# Patient Record
Sex: MALE | Race: WHITE | Employment: OTHER | URBAN - METROPOLITAN AREA
[De-identification: names, ages, dates, MRNs, and addresses within clinical notes are randomized per-mention and may not be internally consistent; named-entity substitution may affect disease eponyms.]

---

## 2018-08-08 PROBLEM — R00.1 BRADYCARDIA: Status: ACTIVE | Noted: 2018-05-20

## 2019-06-05 PROBLEM — N17.9 AKI (ACUTE KIDNEY INJURY) (HCC): Status: ACTIVE | Noted: 2019-03-30

## 2019-06-05 PROBLEM — E55.9 VITAMIN D DEFICIENCY: Status: ACTIVE | Noted: 2019-04-01

## 2020-07-21 ENCOUNTER — HOSPITAL ENCOUNTER (OUTPATIENT)
Dept: PHYSICAL THERAPY | Age: 75
Discharge: HOME | End: 2020-07-21
Payer: MEDICARE

## 2020-07-21 PROCEDURE — 97162 PT EVAL MOD COMPLEX 30 MIN: CPT | Performed by: PHYSICAL THERAPIST

## 2020-07-21 PROCEDURE — 97110 THERAPEUTIC EXERCISES: CPT | Performed by: PHYSICAL THERAPIST

## 2020-07-21 NOTE — PROGRESS NOTES
[facility] PHYSICAL THERAPY AT [address], [place], [address]  Phone: [phone]  Fax: [phone] / [address] [facility]  Patient Name: Florencia Henderson : 1945   Medical   Diagnosis: Right knee pain [M25.561] Treatment Diagnosis: R Knee Pain   Onset Date: May 4, 2020     Referral Source: Jya Son, DO Start of Care [facility]): 2020   Prior Hospitalization: See medical history Provider #: [id number]   Prior Level of Function: Unlimited standing/walking tolerance, climbed stairs without limitation   Comorbidities: H/o L knee unicompartment arthroplasty, triple bypass, and bladder surgery for cancer   Medications: Verified on Patient Summary List   The Plan of Care and following information is based on the information from the initial evaluation.   ===========================================================================================  Assessment / key information:  71 y/o male [place] [facility] reports falling from a ladder at home on May 4, 2020, and injured his right knee. He went to ER and was told radiographs were negative. A later MRI found 3 proximal tibia fractures, and he was immobilized, NWB, for 11 weeks. He has an order that states 50% WB R LE until Aug 1st, then begin WBAT. Pt understands the order, but has chosen to bear more weight because it is more convenient for his living situation. He was cautioned that the WB order was \"there for a reason\" and we would be following these restrictions in the office. Examination revealed R knee extension ROM of -15 form full and 105 degrees flexion. There is moderate swelling, but he has no pain. With stretching, he was able to achieve -5 degrees from full, and could do a SLR without a lag by end of session. HE was educated on RICE principle for swelling management.   Mr Jhoan Sofia has swelling, ROM loss, weakness,a dn limited ambulation tolerance s/p traumatic proximal tibia fractures and prolonged immobilization.  ===========================================================================================  Eval Complexity: History MEDIUM  Complexity : 1-2 comorbidities / personal factors will impact the outcome/ POC ;  Examination  MEDIUM Complexity : 3 Standardized tests and measures addressing body structure, function, activity limitation and / or participation in recreation ; Presentation MEDIUM Complexity : Evolving with changing characteristics ; Decision Making MEDIUM Complexity : FOTO score of 26-74; Overall Complexity MEDIUM  Problem List: pain affecting function, decrease ROM, decrease strength, edema affecting function, impaired gait/ balance, decrease ADL/ functional abilitiies, decrease activity tolerance, decrease flexibility/ joint mobility and decrease transfer abilities   Treatment Plan may include any combination of the following: Therapeutic exercise, Therapeutic activities, Neuromuscular re-education, Physical agent/modality, Gait/balance training, Manual therapy, Dry Needling, Aquatic therapy, Patient education, Self Care training, Functional mobility training, Home safety training and Stair training  Patient / Family readiness to learn indicated by: asking questions, trying to perform skills and interest  Persons(s) to be included in education: patient (P)  Barriers to Learning/Limitations: None  Measures taken:    Patient Goal (s): \"gain full mobility and range of motion\"   Patient self reported health status: fair  Rehabilitation Potential: good   Short Term Goals: To be accomplished in  2  weeks:  1. Pt independent with basic HEP for extension biased ROM and strenghtneing,  2. Pt to begin WBAT, and walk with equal gait components bilaterally.  Long Term Goals: To be accomplished in  4  weeks:  1.  Pt to increase FOTO score from 49 to >/= 69.  2. Pt independent with high level HEP for R knee flexibility, strenghtening/endurance, and proprioception  exercises. 3. Pt able to climb up/down stairs with reciprocal steps without pain or residual swelling. 4.     Pt to have R knee PROM of 0- 125 degrees to allow painfree transfers and bike riding. Frequency / Duration:   Patient to be seen  3  times per week for 4  weeks:  Patient / Caregiver education and instruction: self care, activity modification and exercises    Therapist Signature: Ortiz Garzon PT Date: 3/04/2344   Certification Period: 7/21/20 -10/21/20 Time: 1:36 PM   ===========================================================================================  I certify that the above Physical Therapy Services are being furnished while the patient is under my care. I agree with the treatment plan and certify that this therapy is necessary. Physician Signature:        Date:       Time:     Please sign and return to [facility] [place] or you may fax the signed copy to [phone]. Thank you.

## 2020-07-21 NOTE — PROGRESS NOTES
PHYSICAL THERAPY - DAILY TREATMENT NOTE    Patient Name: Bayron Bermudez        Date: 2020  : 1945   YES Patient  Verified  Visit #:     Insurance: Payor: Priscilla Slight / Plan: [place] & B / Product Type: Medicare /      In time: 3:05 Out time: 4:00   Total Treatment Time: 55     BCBS/Medicare Time Tracking (below)   Total Timed Codes (min):  15 1:1 Treatment Time:  15     TREATMENT AREA =  Right knee pain [M25.561]    SUBJECTIVE  Pain Level (on 0 to 10 scale):  0  / 10   Medication Changes/New allergies or changes in medical history, any new surgeries or procedures?     NO    If yes, update Summary List   Subjective Functional Status/Changes:  []  No changes reported     See eval/POC           Modalities Rationale:     decrease edema, decrease inflammation and decrease pain to improve patient's ability to prevent soreness/swelling   min [] Estim, type/location:                                      []  att     []  unatt     []  w/US     []  w/ice    []  w/heat    min []  Mechanical Traction: type/lbs                   []  pro   []  sup   []  int   []  cont    []  before manual    []  after manual    min []  Ultrasound, settings/location:      min []  Iontophoresis w/ dexamethasone, location:                                               []  take home patch       []  in clinic   10 min [x]  Ice     []  Heat    location/position: R knee - supine after treatment    min []  Vasopneumatic Device, press/temp:     min []  Other:    [x] Skin assessment post-treatment (if applicable):    [x]  intact    []  redness- no adverse reaction     []redness  adverse reaction:        15 min Therapeutic Exercise:  [x]  See flow sheet   Rationale:      increase ROM, increase strength and improve coordination to improve the patients ability to regain functional strength for normal gait         Billed With/As:   [] TE   [] TA   [] Neuro   [] Self Care Patient Education: [x] Review HEP    [] Progressed/Changed HEP based on:   [] positioning   [] body mechanics   [] transfers   [] heat/ice application    [] other:      Other Objective/Functional Measures:    FOTO - 49     Post Treatment Pain Level (on 0 to 10) scale:   0  / 10     ASSESSMENT  Assessment/Changes in Function:     Pt has ROM loss, swelling, weakness, and limited gait s/p proximal tibia fractures form a fall form ladder. []  See Progress Note/Recertification   Patient will continue to benefit from skilled PT services to modify and progress therapeutic interventions, address functional mobility deficits, address ROM deficits, address strength deficits, analyze and address soft tissue restrictions, analyze and cue movement patterns, analyze and modify body mechanics/ergonomics, and assess and modify postural abnormalities to attain remaining goals.    Progress toward goals / Updated goals:    Goals established today     PLAN  [x]  Upgrade activities as tolerated YES Continue plan of care   []  Discharge due to :    []  Other:      Therapist: Marian Schulte PT    Date: 7/21/2020 Time: 1:36 PM     Future Appointments   Date Time Provider Christian Mcgill   7/21/2020  3:00 PM [facility] [place] [facility]   8/10/2020  1:00 PM [facility] URODYNAMICS [facility] OpenPM   8/14/2020 11:30 AM [facility] ULTRASOUND [facility] OpenPM   8/19/2020 11:30 AM Linda Moran MD [facility] OpenPM

## 2020-07-23 ENCOUNTER — HOSPITAL ENCOUNTER (OUTPATIENT)
Dept: PHYSICAL THERAPY | Age: 75
Discharge: HOME | End: 2020-07-23
Payer: MEDICARE

## 2020-07-23 PROCEDURE — 97140 MANUAL THERAPY 1/> REGIONS: CPT

## 2020-07-23 PROCEDURE — 97110 THERAPEUTIC EXERCISES: CPT

## 2020-07-23 NOTE — PROGRESS NOTES
PHYSICAL THERAPY - DAILY TREATMENT NOTE     Patient Name: John Styles        Date: 2020  : 1945   YES Patient  Verified  Visit #:      12  Insurance: Payor: Álvaro Valera / Plan: [place] MEDICARE PART A & B / Product Type: Medicare /      In time: 5574 Out time: 110   Total Treatment Time: 45     Medicare/BCBS Time Tracking (below)   Total Timed Codes (min):  35 1:1 Treatment Time:  35     TREATMENT AREA =  Right knee pain [M25.561]    SUBJECTIVE    Pain Level (on 0 to 10 scale):  0  / 10   Medication Changes/New allergies or changes in medical history, any new surgeries or procedures? NO    If yes, update Summary List   Subjective Functional Status/Changes:  []  No changes reported       Functional improvements: Pt reports compliance with HEP. Functional impairments: Limited weightbearing capacity, knee ROM. OBJECTIVE  Modalities Rationale:     decrease inflammation and decrease pain to improve patient's ability to ambulate. min [] Estim, type/location:                                      []  att     []  unatt     []  w/US     []  w/ice    []  w/heat    min []  Mechanical Traction: type/lbs                   []  pro   []  sup   []  int   []  cont    []  before manual    []  after manual    min []  Ultrasound, settings/location:      min []  Iontophoresis w/ dexamethasone, location:                                               []  take home patch       []  in clinic   10 min [x]  Ice     []  Heat    location/position: Supine, (R) knee. min []  Vasopneumatic Device, press/temp:     min []  Other:    [x] Skin assessment post-treatment (if applicable):    [x]  intact    []  redness- no adverse reaction     []redness  adverse reaction:      20 min Therapeutic Exercise:  [x]  See flow sheet   Rationale:      increase ROM, increase strength, improve coordination and improve balance to improve the patients ability to perform pain free ADLs.       15 min Manual Therapy: Technique:      [x] S/DTM []IASTM [x]PROM [] Passive Stretching   [x]manual TPR    []Jt manipulation:Gr I [] II []  III [] IV[] V[]  Treatment Area:     Rationale:      decrease pain, increase ROM, increase tissue extensibility and decrease trigger points to improve patient's ability to perform pain free ADLs. Billed With/As:   [x] TE   [] TA   [] Neuro   [] Self Care Patient Education: [x] Review HEP    [] Progressed/Changed HEP based on:   [] positioning   [] body mechanics   [] transfers   [] heat/ice application    [] other:        Other Objective/Functional Measures:    Heel slides - knee flexion ROM = 140 deg   Knee ext w/ manual OP = -3 deg      Post Treatment Pain Level (on 0 to 10) scale:   0  / 10     ASSESSMENT    Assessment/Changes in Function:     Significant improvement in knee ROM. Pt non-compliant with 50% weightbearing orders, did not attempt standing activities due to this. []  See Progress Note/Recertification   Patient will continue to benefit from skilled PT services to modify and progress therapeutic interventions, address functional mobility deficits, address ROM deficits, address strength deficits, analyze and address soft tissue restrictions, analyze and cue movement patterns and analyze and modify body mechanics/ergonomics to attain remaining goals. Progress toward goals / Updated goals:    Good progress toward LTG #4.       PLAN    [x]  Upgrade activities as tolerated YES Continue plan of care   []  Discharge due to :    []  Other:      Therapist: Arias Almanzar PTA    Date: 7/23/2020 Time: 2:51 PM     Future Appointments   Date Time Provider Christian Mcgill   7/24/2020  4:00 PM Maybelle Rule [facility]   7/28/2020 10:15 AM [facility]   7/29/2020  9:15 AM Nicole Barakat PT [facility]   7/31/2020  4:00 PM Maybelle Rule [facility]   8/3/2020  8:45 AM Venkat Almeida PT [facility]   8/5/2020 11:30 AM Danita [facility] 8/7/2020 12:30 PM Thelma Grinder [facility]   8/10/2020 10:45 AM Thelma Grinder [facility]   8/10/2020  1:00 PM [facility]   8/12/2020 10:45 AM Thelma Grinder [facility]   8/14/2020 11:30 AM [facility] ULTRASOUND [facility] OpenPM   8/14/2020  2:30 PM Ino Burch PT [facility]   8/19/2020 11:30 AM Ashley Matos MD [facility] OpenPM

## 2020-07-24 ENCOUNTER — HOSPITAL ENCOUNTER (OUTPATIENT)
Dept: PHYSICAL THERAPY | Age: 75
Discharge: HOME | End: 2020-07-24
Payer: MEDICARE

## 2020-07-24 PROCEDURE — 97110 THERAPEUTIC EXERCISES: CPT

## 2020-07-24 PROCEDURE — 97140 MANUAL THERAPY 1/> REGIONS: CPT

## 2020-07-24 NOTE — PROGRESS NOTES
PHYSICAL THERAPY - DAILY TREATMENT NOTE    Patient Name: Te Fuentes        Date: 2020  : 1945    Patient  Verified: YES  Visit #:   3   of   12  Insurance: Payor: Radhika Feeler / Plan: [place] MEDICARE PART A & B / Product Type: Medicare /      In time: 4:05 Out time: 4:40   Total Treatment Time: 35     Medicare/BCBS Time Tracking (below)   Total Timed Codes (min):  25 1:1 Treatment Time:  25     TREATMENT AREA/ DIAGNOSIS = Right knee pain [M25.561]    SUBJECTIVE  Pain Level (on 0 to 10 scale):  0  / 10   Medication Changes/New allergies or changes in medical history, any new surgeries or procedures?     NO    If yes, update Summary List   Subjective Functional Status/Changes:  []  No changes reported     Functional improvement no pain   Functional limitation  I cant golf        OBJECTIVE  Modalities Rationale:     decrease edema, decrease inflammation and decrease pain to improve patient's ability to perform ADLs without pain   min [] Estim, type/location:                                      []  att     []  unatt     []  w/US     []  w/ice    []  w/heat    min []  Mechanical Traction: type/lbs                   []  pro   []  sup   []  int   []  cont    []  before manual    []  after manual    min []  Ultrasound, settings/location:      min []  Iontophoresis w/ dexamethasone, location:                                               []  take home patch       []  in clinic   10 min [x]  Ice     []  Heat    location/position:     min []  Vasopneumatic Device, press/temp:     min []  Other:    [x] Skin assessment post-treatment (if applicable):    [x]  intact    []  redness- no adverse reaction     []redness  adverse reaction:        8 min Manual Therapy: DTM to post knee/quad, patellar mobs and passive extension   Rationale:      decrease pain, increase ROM and increase tissue extensibility to improve patient's ability to perform ADLs without pain    17 min Therapeutic Exercise:  [x]  See flow sheet Rationale:      increase ROM and increase strength to improve the patients ability to perform ADLs without pain     Billed With/As:   [x] TE   [] TA   [] Neuro   [] Self Care Patient Education: [x] Review HEP    [] Progressed/Changed HEP based on:   [] positioning   [] body mechanics   [] transfers   [] heat/ice application    [] other:        Other Objective/Functional Measures:  Walking with st cane with lacking TKE  Pt with -10 degrees of active extension  Able to get full passive ext after manual  SLR without lag  Added bridge and clam   Post Treatment Pain Level (on 0 to 10) scale:   0  / 10     ASSESSMENT  Assessment/Changes in Function:     No pain with bridge or clam     []  See Progress Note/Recertification   Patient will continue to benefit from skilled PT services to modify and progress therapeutic interventions, address functional mobility deficits, address ROM deficits, address strength deficits, analyze and address soft tissue restrictions, analyze and cue movement patterns, analyze and modify body mechanics/ergonomics and assess and modify postural abnormalities to attain remaining goals.    Progress toward goals / Updated goals:    Improved ROM and strength     PLAN  [x]  Upgrade activities as tolerated YES Continue plan of care   []  Discharge due to :    []  Other:      Therapist: Karo Alvarado PT    Date: 7/24/2020 Time: 4:30 PM     Future Appointments   Date Time Provider Christian Mcgill   7/28/2020 10:15 AM [facility]   7/29/2020  9:15 AM Megan Jimenes, PT [facility]   7/31/2020  4:00 PM Mario Roblesi [facility]   8/3/2020  8:45 AM Ricky Burrows, PT [facility]   8/5/2020 11:30 AM Jyaroival Kitchen [facility]   8/7/2020 12:30 PM Percival Kitchen [facility]   8/10/2020 10:45 AM Percival Kitchen [facility]   8/10/2020  1:00 PM [facility] URODYNAMICS [facility] OpenPM   8/12/2020 10:45 AM Percival Kitchen [facility]   8/14/2020 11:30 AM [facility] ULTRASOUND [facility] OpenPM   8/14/2020  2:30 PM Megan Jimenes PT [facility]   8/19/2020 11:30 AM Kevon Easley MD [facility] OpenPM

## 2020-07-28 ENCOUNTER — HOSPITAL ENCOUNTER (OUTPATIENT)
Dept: PHYSICAL THERAPY | Age: 75
Discharge: HOME | End: 2020-07-28
Payer: MEDICARE

## 2020-07-28 PROCEDURE — 97110 THERAPEUTIC EXERCISES: CPT

## 2020-07-28 PROCEDURE — 97140 MANUAL THERAPY 1/> REGIONS: CPT

## 2020-07-28 NOTE — PROGRESS NOTES
PHYSICAL THERAPY - DAILY TREATMENT NOTE     Patient Name: John Styles        Date: 2020  : 1945   YES Patient  Verified  Visit #:     Insurance: Payor: Álvaro Valera / Plan: [place] MEDICARE PART A & B / Product Type: Medicare /      In time: 7047 Out time: 8248   Total Treatment Time: 60     Medicare/BCBS Time Tracking (below)   Total Timed Codes (min):  50 1:1 Treatment Time:       TREATMENT AREA =  Right knee pain [M25.561]    SUBJECTIVE    Pain Level (on 0 to 10 scale):  0  / 10   Medication Changes/New allergies or changes in medical history, any new surgeries or procedures? NO    If yes, update Summary List   Subjective Functional Status/Changes:  []  No changes reported       Functional improvements: I'm walking a little better. Functional impairments: Limited knee ext, unable to fully weightbear on (R) until . OBJECTIVE  Modalities Rationale:     decrease inflammation and decrease pain to improve patient's ability to perform pain free ADLs. min [] Estim, type/location:                                      []  att     []  unatt     []  w/US     []  w/ice    []  w/heat    min []  Mechanical Traction: type/lbs                   []  pro   []  sup   []  int   []  cont    []  before manual    []  after manual    min []  Ultrasound, settings/location:      min []  Iontophoresis w/ dexamethasone, location:                                               []  take home patch       []  in clinic   10 min [x]  Ice     []  Heat    location/position: Supine, (R) knee. min []  Vasopneumatic Device, press/temp:     min []  Other:    [x] Skin assessment post-treatment (if applicable):    [x]  intact    []  redness- no adverse reaction     []redness  adverse reaction:      35 min Therapeutic Exercise:  [x]  See flow sheet   Rationale:      increase ROM, increase strength, improve coordination and improve balance to improve the patients ability to perform pain free ADLs. 15 min Manual Therapy: Technique:      [x] S/DTM []IASTM [x]PROM [] Passive Stretching   [x]manual TPR    []Jt manipulation:Gr I [] II []  III [] IV[] V[]  Treatment Area:     Rationale:      decrease pain, increase ROM, increase tissue extensibility and decrease trigger points to improve patient's ability to perform pain free ADLs. Billed With/As:   [x] TE   [] TA   [] Neuro   [] Self Care Patient Education: [x] Review HEP    [] Progressed/Changed HEP based on:   [] positioning   [] body mechanics   [] transfers   [] heat/ice application    [] other:        Other Objective/Functional Measures: Therex per flow sheet. Post Treatment Pain Level (on 0 to 10) scale:   0  / 10     ASSESSMENT    Assessment/Changes in Function:     Gradual improvement in knee ext. Flexion WNL. []  See Progress Note/Recertification   Patient will continue to benefit from skilled PT services to modify and progress therapeutic interventions, address functional mobility deficits, address ROM deficits, address strength deficits, analyze and address soft tissue restrictions, analyze and cue movement patterns, analyze and modify body mechanics/ergonomics and assess and modify postural abnormalities to attain remaining goals. Progress toward goals / Updated goals:    Good progress toward LTG #4.        PLAN    [x]  Upgrade activities as tolerated YES Continue plan of care   []  Discharge due to :    []  Other:      Therapist: Arias Almanzar PTA    Date: 7/28/2020 Time: 10:43 AM     Future Appointments   Date Time Provider Christian Mcgill   7/29/2020  9:15 AM Nicole Barakat PT [facility]   7/31/2020  4:00 PM Naveen Gillespie [facility]   8/3/2020  8:45 AM Venkat Almeida PT [facility]   8/5/2020 11:30 AM Awilda Craven [facility]   8/7/2020 12:30 PM Awilda Craven [facility]   8/10/2020 10:45 AM Awilda Craven [facility]   8/10/2020  1:00 PM [facility] URODYNAMICS [facility] OpenPM   8/12/2020 10:45 AM Senora Sink [facility]   8/14/2020 11:30 AM [facility] ULTRASOUND [facility] OpenPM   8/14/2020  2:30 PM Patrick Thomas PT [facility]   8/19/2020 11:30 AM Elisha Osorio MD [facility] OpenPM

## 2020-07-29 ENCOUNTER — HOSPITAL ENCOUNTER (OUTPATIENT)
Dept: PHYSICAL THERAPY | Age: 75
Discharge: HOME | End: 2020-07-29
Payer: MEDICARE

## 2020-07-29 PROCEDURE — 97016 VASOPNEUMATIC DEVICE THERAPY: CPT

## 2020-07-29 PROCEDURE — 97140 MANUAL THERAPY 1/> REGIONS: CPT

## 2020-07-29 PROCEDURE — 97110 THERAPEUTIC EXERCISES: CPT

## 2020-07-29 NOTE — PROGRESS NOTES
PHYSICAL THERAPY - DAILY TREATMENT NOTE    Patient Name: Florencia Henderson        Date: 2020  : 1945    Patient  Verified: YES  Visit #:      of   12  Insurance: Payor: Jamaal Patel / Plan: [place] MEDICARE PART A & B / Product Type: Medicare /      In time: 9:15 Out time: 10   Total Treatment Time: 45     Medicare/BCBS Time Tracking (below)   Total Timed Codes (min):  30 1:1 Treatment Time:  30     TREATMENT AREA/ DIAGNOSIS = Right knee pain [M25.561]    SUBJECTIVE  Pain Level (on 0 to 10 scale):  2  / 10   Medication Changes/New allergies or changes in medical history, any new surgeries or procedures?     NO    If yes, update Summary List   Subjective Functional Status/Changes:  []  No changes reported     Functional improvement I can walk better   Functional limitation  Its sore today        OBJECTIVE  Modalities Rationale:     decrease edema, decrease inflammation and decrease pain to improve patient's ability to perform ADLs without pain   min [] Estim, type/location:                                      []  att     []  unatt     []  w/US     []  w/ice    []  w/heat    min []  Mechanical Traction: type/lbs                   []  pro   []  sup   []  int   []  cont    []  before manual    []  after manual    min []  Ultrasound, settings/location:      min []  Iontophoresis w/ dexamethasone, location:                                               []  take home patch       []  in clinic    min []  Ice     []  Heat    location/position:    15 min [x]  Vasopneumatic Device, press/temp: Max pressure at 34 degrees    min []  Other:    [x] Skin assessment post-treatment (if applicable):    [x]  intact    []  redness- no adverse reaction     []redness  adverse reaction:        8 min Manual Therapy: DTM to post knee, passive ext and patellar mobs   Rationale:      decrease pain, increase ROM and increase tissue extensibility to improve patient's ability to perform ADLs without pain    22 min Therapeutic Exercise: [x]  See flow sheet   Rationale:      increase ROM and increase strength to improve the patients ability to perform ADLs without pain     Billed With/As:   [x] TE   [] TA   [] Neuro   [] Self Care Patient Education: [x] Review HEP    [] Progressed/Changed HEP based on:   [] positioning   [] body mechanics   [] transfers   [] heat/ice application    [] other:        Other Objective/Functional Measures:    Pt with continued lack of TKE with gait with st cane. Pt lacking > 10 degrees of active extension to start treatment, but able to achieve full active ext and SLR without lag after manual  Added TKE at wall with ball and sit to stand  Added Vasopnuematic to control edema   Post Treatment Pain Level (on 0 to 10) scale:   0  / 10     ASSESSMENT  Assessment/Changes in Function:     active ext not improving     []  See Progress Note/Recertification   Patient will continue to benefit from skilled PT services to modify and progress therapeutic interventions, address functional mobility deficits, address ROM deficits, address strength deficits, analyze and address soft tissue restrictions, analyze and cue movement patterns, analyze and modify body mechanics/ergonomics and assess and modify postural abnormalities to attain remaining goals.    Progress toward goals / Updated goals:    Good SLR without lag after manual     PLAN  [x]  Upgrade activities as tolerated YES Continue plan of care   []  Discharge due to :    []  Other:      Therapist: Carol Ann Hanks PT    Date: 7/29/2020 Time: 9:48 AM     Future Appointments   Date Time Provider Christian Mcgill   7/31/2020  4:00 PM Marybelle London [facility]   8/3/2020  8:45 AM Mariama Sterling PT [facility]   8/5/2020 11:30 AM Charis Kern [facility]   8/7/2020 12:30 PM Charis Kern [facility]   8/10/2020 10:45 AM Charis Kern [facility]   8/10/2020  1:00 PM [facility]   8/12/2020 10:45 AM Charis Kern [facility] 8/14/2020 11:30 AM [facility] ULTRASOUND [facility]   8/14/2020  2:30 PM Coy Hamlin PT [facility] [address]   8/19/2020 11:30 AM Nhugn Simpson MD [facility]

## 2020-07-31 ENCOUNTER — HOSPITAL ENCOUNTER (OUTPATIENT)
Dept: PHYSICAL THERAPY | Age: 75
Discharge: HOME | End: 2020-07-31
Payer: MEDICARE

## 2020-07-31 PROCEDURE — 97140 MANUAL THERAPY 1/> REGIONS: CPT

## 2020-07-31 PROCEDURE — 97110 THERAPEUTIC EXERCISES: CPT

## 2020-07-31 NOTE — PROGRESS NOTES
PHYSICAL THERAPY - DAILY TREATMENT NOTE    Patient Name: Liliana Wayne        Date: 2020  : 1945    Patient  Verified: YES  Visit #:     Insurance: Payor: Gerhardt Hinders / Plan: [place] MEDICARE PART A & B / Product Type: Medicare /      In time: 3:50 Out time: 4:35   Total Treatment Time: 45     Medicare/BCBS Time Tracking (below)   Total Timed Codes (min):  35 1:1 Treatment Time:  35     TREATMENT AREA/ DIAGNOSIS = Right knee pain [M25.561]    SUBJECTIVE  Pain Level (on 0 to 10 scale):  0  / 10   Medication Changes/New allergies or changes in medical history, any new surgeries or procedures?     NO    If yes, update Summary List   Subjective Functional Status/Changes:  []  No changes reported     Functional improvement no pain today   Functional limitation I cant get my knee straight         OBJECTIVE  Modalities Rationale:     decrease edema, decrease inflammation and decrease pain to improve patient's ability to perform ADLs without pain   min [] Estim, type/location:                                      []  att     []  unatt     []  w/US     []  w/ice    []  w/heat    min []  Mechanical Traction: type/lbs                   []  pro   []  sup   []  int   []  cont    []  before manual    []  after manual    min []  Ultrasound, settings/location:      min []  Iontophoresis w/ dexamethasone, location:                                               []  take home patch       []  in clinic   10 min [x]  Ice     []  Heat    location/position:     min []  Vasopneumatic Device, press/temp:     min []  Other:    [x] Skin assessment post-treatment (if applicable):    [x]  intact    []  redness- no adverse reaction     []redness  adverse reaction:        10 min Manual Therapy: DTM to post R LE , retrograde massage and patellar mobs and passive extension   Rationale:      decrease pain, increase ROM and increase tissue extensibility to improve patient's ability to perform ADLs without pain    25 min Therapeutic Exercise:  [x]  See flow sheet   Rationale:      increase ROM and increase strength to improve the patients ability to perform ADLs without pain     Billed With/As:   [x] TE   [] TA   [] Neuro   [] Self Care Patient Education: [x] Review HEP    [] Progressed/Changed HEP based on:   [] positioning   [] body mechanics   [] transfers   [] heat/ice application    [] other:        Other Objective/Functional Measures:    Pt still lackingTKE with gait and full active extension  Full passive extension  Added wall slide  advised pt to do extension hangs at home    Post Treatment Pain Level (on 0 to 10) scale:   0  / 10     ASSESSMENT  Assessment/Changes in Function:     Continued edema and  Lack of extension     []  See Progress Note/Recertification   Patient will continue to benefit from skilled PT services to modify and progress therapeutic interventions, address functional mobility deficits, address ROM deficits, address strength deficits, analyze and address soft tissue restrictions, analyze and cue movement patterns and analyze and modify body mechanics/ergonomics to attain remaining goals.    Progress toward goals / Updated goals:    Good quat set     PLAN  [x]  Upgrade activities as tolerated YES Continue plan of care   []  Discharge due to :    [x]  Other: Start extension hangs     Therapist: Maria Isabel Rajput, PT    Date: 7/31/2020 Time: 4:28 PM     Future Appointments   Date Time Provider Christian Mcgill   8/3/2020  8:45 AM Beatrice Howe PT [facility]   8/5/2020 11:30 AM Adela Buttery [facility]   8/7/2020 12:30 PM Adela Buttery [facility]   8/10/2020 10:45 AM Adela Buttery [facility]   8/10/2020  1:00 PM [facility] URODYNAMICS [facility]   8/12/2020 10:45 AM Adela Buttery [facility]   8/14/2020 11:30 AM [facility] ULTRASOUND [facility]   8/14/2020  2:30 PM Mirian Villalpando PT [facility]   8/19/2020 11:30 AM Hany Acosta MD [facility]

## 2020-08-03 ENCOUNTER — HOSPITAL ENCOUNTER (OUTPATIENT)
Dept: PHYSICAL THERAPY | Age: 75
Discharge: HOME | End: 2020-08-03
Payer: MEDICARE

## 2020-08-03 PROCEDURE — 97140 MANUAL THERAPY 1/> REGIONS: CPT

## 2020-08-03 PROCEDURE — 97110 THERAPEUTIC EXERCISES: CPT

## 2020-08-03 NOTE — PROGRESS NOTES
PHYSICAL THERAPY - DAILY TREATMENT NOTE     Patient Name: Jame South        Date: 8/3/2020  : 1945   YES Patient  Verified  Visit #:     Insurance: Payor: Idolina Babinski / Plan: [place] MEDICARE PART A & B / Product Type: Medicare /      In time: 336 Out time: 948   Total Treatment Time: 50     Medicare/BCBS Time Tracking (below)   Total Timed Codes (min):  40 1:1 Treatment Time:  40     TREATMENT AREA =  Right knee pain [M25.561]    SUBJECTIVE    Pain Level (on 0 to 10 scale):  2  / 10   Medication Changes/New allergies or changes in medical history, any new surgeries or procedures?     NO    If yes, update Summary List   Subjective Functional Status/Changes:  []  No changes reported   Intermittent achiness           OBJECTIVE  Modalities Rationale:     decrease edema, decrease inflammation and decrease pain to improve patient's ability to amb without deviation      min [] Estim, type/location:                                      []  att     []  unatt     []  w/US     []  w/ice    []  w/heat    min []  Mechanical Traction: type/lbs                   []  pro   []  sup   []  int   []  cont    []  before manual    []  after manual    min []  Ultrasound, settings/location:      min []  Iontophoresis w/ dexamethasone, location:                                               []  take home patch       []  in clinic   10 min []  Ice     []  Heat    location/position:     min []  Vasopneumatic Device, press/temp:     min []  Other:    [] Skin assessment post-treatment (if applicable):    []  intact    []  redness- no adverse reaction     []redness  adverse reaction:      10 min Manual Therapy: Technique:      [x] S/DTM []IASTM []PROM [x] Passive Stretching - ext mobes  [x]manual TPR  [] SOR [] man traction  []Jt manipulation:Gr I [] II []  III [] IV[]   [] OP with REIL    []   Treatment Area:  R knee   Rationale:      decrease pain, increase ROM, increase tissue extensibility and decrease trigger points to improve patient's ability to minimize gait deviation    30 min Therapeutic Exercise:  [x]  See flow sheet   Rationale:      increase ROM, increase strength and improve balance to improve the patients ability to amb without deviation         Billed With/As:   [x] TE   [] TA   [] Neuro   [] Self Care Patient Education: [x] Review HEP    [] Progressed/Changed HEP based on:   [] positioning   [] body mechanics   [] transfers   [] heat/ice application    [] other:        Other Objective/Functional Measures:  Enters clinic without AD--FWB*    -10 deg from full extension    Added 1# to SLR and hip abd--pt challenged; added prone knee hang to encourage ext   Post Treatment Pain Level (on 0 to 10) scale:   0  / 10     ASSESSMENT  Assessment/Changes in Function:      Functional improvement:  amb without AD             Patient will continue to benefit from skilled PT services to modify and progress therapeutic interventions, address functional mobility deficits, address ROM deficits, address strength deficits, analyze and address soft tissue restrictions, analyze and cue movement patterns and address imbalance/dizziness to attain remaining goals.    Progress toward goals / Updated goals:    Slow Progress to    [] STG    [] LTG  4 as shown by ext ROM measure         []  See Progress Note/Recertification    PLAN    [x]  Upgrade activities as tolerated {YES) Continue plan of care   []  Discharge due to :    []  Other:      Therapist: Quoc Henderson PT    Date: 8/3/2020 Time: 8:52 AM     Future Appointments   Date Time Provider Christian Mcgill   8/5/2020 11:30 AM Gunnar Butte [facility]   8/7/2020 12:30 PM Gunnar Butte [facility]   8/10/2020 10:45 AM Gunnar Butte [facility]   8/10/2020  1:00 PM [facility]   8/12/2020 10:45 AM Gunnar Butte [facility]   8/14/2020 11:30 AM [facility]   8/14/2020  2:30 PM Dolores Macdonald PT [facility]   8/19/2020 11:30 AM Shawn Snellen, MD [facility]

## 2020-08-05 ENCOUNTER — HOSPITAL ENCOUNTER (OUTPATIENT)
Dept: PHYSICAL THERAPY | Age: 75
Discharge: HOME | End: 2020-08-05
Payer: MEDICARE

## 2020-08-05 PROCEDURE — 97016 VASOPNEUMATIC DEVICE THERAPY: CPT

## 2020-08-05 PROCEDURE — 97110 THERAPEUTIC EXERCISES: CPT

## 2020-08-05 PROCEDURE — 97140 MANUAL THERAPY 1/> REGIONS: CPT

## 2020-08-05 NOTE — PROGRESS NOTES
PHYSICAL THERAPY - DAILY TREATMENT NOTE    Patient Name: Dominic Huang        Date: 2020  : 1945    Patient  Verified: YES  Visit #:     Insurance: Payor: Tenzin Snare / Plan: [place] MEDICARE PART A & B / Product Type: Medicare /      In time: 11:30 Out time: 12:30   Total Treatment Time: 60     Medicare Time Tracking (below)   Total Timed Codes (min):  50 1:1 Treatment Time:  50     TREATMENT AREA/ DIAGNOSIS = Right knee pain [M25.561]    SUBJECTIVE  Pain Level (on 0 to 10 scale):  2  / 10   Medication Changes/New allergies or changes in medical history, any new surgeries or procedures? NO    If yes, update Summary List   Subjective Functional Status/Changes:  []  No changes reported     Pt reports difficulty with stairs. OBJECTIVE  Modalities Rationale:     decrease inflammation and decrease pain to improve patient's ability to perform ADLs without pain     min [] Estim, type/location:                                      []  att     []  unatt     []  w/US     []  w/ice    []  w/heat    min []  Mechanical Traction: type/lbs                   []  pro   []  sup   []  int   []  cont    []  before manual    []  after manual    min []  Ultrasound, settings/location:      min []  Iontophoresis w/ dexamethasone, location:                                               []  take home patch       []  in clinic    min []  Ice     []  Heat    location/position:    10 min [x]  Vasopneumatic Device, press/temp: Mod pressure to help force extension and decreased any inflammation    min []  Other:    [] Skin assessment post-treatment (if applicable):    []  intact    []  redness- no adverse reaction     []redness  adverse reaction:        40 min Therapeutic Exercise:  [x]  See flow sheet   Rationale:      increase ROM, increase strength and improve coordination to improve the patients ability to perform ADLs without pain       10 min Manual Therapy: PROM to R knee. Extension with overpressure. Trigger point release to medial hamstring   Rationale:      increase ROM and increase tissue extensibility to improve patient's ability to perform ADLs without pain    Billed With/As:   [x] TE   [] TA   [] Neuro   [] Self Care Patient Education: [x] Review HEP    [] Progressed/Changed HEP based on:   [] positioning   [] body mechanics   [] transfers   [] heat/ice application    [] other:        Other Objective/Functional Measures:    Pt at -15deg ext at beginning of session  Pt at -10deg ext at end of session   Post Treatment Pain Level (on 0 to 10) scale:   2  / 10     ASSESSMENT  Assessment/Changes in Function:     Pt able to improve extension with exercises and manual therapy techniques        []  See Progress Note/Recertification   Patient will continue to benefit from skilled PT services to modify and progress therapeutic interventions, address functional mobility deficits, address ROM deficits, address strength deficits, analyze and address soft tissue restrictions and analyze and cue movement patterns to attain remaining goals.    Progress toward goals / Updated goals:    Good Progress to    [] STG    [x] LTG  1 as shown by improved extension needed for ADL improvement     PLAN  [x]  Upgrade activities as tolerated YES Continue plan of care   []  Discharge due to :    []  Other:      Therapist: Crystal Morelos DPT     Date: 8/5/2020 Time: 11:46 AM        Future Appointments   Date Time Provider Christian Mcgill   8/7/2020 12:30 PM Louanna Shear [facility]   8/10/2020 10:45 AM Louanna Shear [facility]   8/10/2020  1:00 PM [facility] URODYNAMICS [facility]   8/12/2020 10:45 AM Louanna Shear [facility]   8/14/2020 11:30 AM [facility]   8/14/2020  2:30 PM Bekah Mckeon PT [facility]   8/19/2020 11:30 AM Eletha Peabody, MD [facility] OpenPM

## 2020-08-07 ENCOUNTER — HOSPITAL ENCOUNTER (OUTPATIENT)
Dept: PHYSICAL THERAPY | Age: 75
Discharge: HOME | End: 2020-08-07
Payer: MEDICARE

## 2020-08-07 PROCEDURE — 97140 MANUAL THERAPY 1/> REGIONS: CPT

## 2020-08-07 PROCEDURE — 97016 VASOPNEUMATIC DEVICE THERAPY: CPT

## 2020-08-07 PROCEDURE — 97110 THERAPEUTIC EXERCISES: CPT

## 2020-08-07 NOTE — PROGRESS NOTES
PHYSICAL THERAPY - DAILY TREATMENT NOTE    Patient Name: Gia Purchase        Date: 2020  : 1945    Patient  Verified: YES  Visit #:     Insurance: Payor: Casey Apodaca / Plan: [place] MEDICARE PART A & B / Product Type: Medicare /      In time: 12:30 Out time: 1:25   Total Treatment Time: 55     Medicare Time Tracking (below)   Total Timed Codes (min):  45 1:1 Treatment Time:  45     TREATMENT AREA/ DIAGNOSIS = Right knee pain [M25.561]    SUBJECTIVE  Pain Level (on 0 to 10 scale):  2  / 10   Medication Changes/New allergies or changes in medical history, any new surgeries or procedures? NO    If yes, update Summary List   Subjective Functional Status/Changes:  []  No changes reported     Pt reports some quad pain with walking      OBJECTIVE  Modalities Rationale:     decrease inflammation and decrease pain to improve patient's ability to perform ADLs without pain     min [] Estim, type/location:                                      []  att     []  unatt     []  w/US     []  w/ice    []  w/heat    min []  Mechanical Traction: type/lbs                   []  pro   []  sup   []  int   []  cont    []  before manual    []  after manual    min []  Ultrasound, settings/location:      min []  Iontophoresis w/ dexamethasone, location:                                               []  take home patch       []  in clinic    min []  Ice     []  Heat    location/position:    10 min X  Vasopneumatic Device, press/temp: R knee    min []  Other:    [] Skin assessment post-treatment (if applicable):    []  intact    []  redness- no adverse reaction     []redness  adverse reaction:        35 min Therapeutic Exercise:  [x]  See flow sheet   Rationale:      increase strength and improve coordination to improve the patients ability to perform ADLs without pain       10 min Manual Therapy: PROM to R knee.  Passive extension with overpressure   Rationale:      increase ROM and increase tissue extensibility to improve patient's ability to perform ADLs without pain    Billed With/As:   [x] TE   [] TA   [] Neuro   [] Self Care Patient Education: [x] Review HEP    [] Progressed/Changed HEP based on:   [] positioning   [] body mechanics   [] transfers   [] heat/ice application    [] other:        Other Objective/Functional Measures:    Pt still lacking full R knee extension   Post Treatment Pain Level (on 0 to 10) scale:   0  / 10     ASSESSMENT  Assessment/Changes in Function:     Pt showed better sit to stand today and required less cueing for equal pressure on his LEs     []  See Progress Note/Recertification   Patient will continue to benefit from skilled PT services to modify and progress therapeutic interventions, address functional mobility deficits, address ROM deficits and address strength deficits to attain remaining goals.    Progress toward goals / Updated goals:    Good Progress to    [] STG    [x] LTG  1 as shown by improved functional sit to stand     PLAN  [x]  Upgrade activities as tolerated YES Continue plan of care   []  Discharge due to :    []  Other:      Therapist: Basil Deras DPT     Date: 8/7/2020 Time: 12:42 PM        Future Appointments   Date Time Provider Christian Mcgill   8/10/2020 10:45 AM Sheree Spear [facility]   8/10/2020  1:00 PM [facility] URODYNAMICS [facility]   8/12/2020 10:45 AM Sheree Spear [facility]   8/14/2020 11:30 AM [facility] ULTRASOUND [facility] Open[facility]   8/14/2020  2:30 PM William Del Valle PT [facility]   8/19/2020 11:30 AM Ollie Echavarria MD [facility] OpenPM

## 2020-08-10 ENCOUNTER — HOSPITAL ENCOUNTER (OUTPATIENT)
Dept: PHYSICAL THERAPY | Age: 75
Discharge: HOME | End: 2020-08-10
Payer: MEDICARE

## 2020-08-10 PROCEDURE — 97016 VASOPNEUMATIC DEVICE THERAPY: CPT

## 2020-08-10 PROCEDURE — 97110 THERAPEUTIC EXERCISES: CPT

## 2020-08-10 PROCEDURE — 97140 MANUAL THERAPY 1/> REGIONS: CPT

## 2020-08-10 NOTE — PROGRESS NOTES
PHYSICAL THERAPY - DAILY TREATMENT NOTE    Patient Name: Vincenzo Mckeon        Date: 8/10/2020  : 1945    Patient  Verified: YES  Visit #:   10   of   18  Insurance: Payor: Latha Umana / Plan: [place] MEDICARE PART A & B / Product Type: Medicare /      In time: 10:45 Out time: 11:47   Total Treatment Time: 58     Medicare Time Tracking (below)   Total Timed Codes (min):  50 1:1 Treatment Time:  40     TREATMENT AREA/ DIAGNOSIS = Right knee pain [M25.561]    SUBJECTIVE  Pain Level (on 0 to 10 scale):  2  / 10   Medication Changes/New allergies or changes in medical history, any new surgeries or procedures? NO    If yes, update Summary List   Subjective Functional Status/Changes:  []  No changes reported     See Recert      OBJECTIVE  Modalities Rationale:     decrease inflammation and decrease pain to improve patient's ability to perform ADLs without pain     min [] Estim, type/location:                                      []  att     []  unatt     []  w/US     []  w/ice    []  w/heat    min []  Mechanical Traction: type/lbs                   []  pro   []  sup   []  int   []  cont    []  before manual    []  after manual    min []  Ultrasound, settings/location:      min []  Iontophoresis w/ dexamethasone, location:                                               []  take home patch       []  in clinic    min []  Ice     []  Heat    location/position:    12 min [x]  Vasopneumatic Device, press/temp: R knee    min []  Other:    [] Skin assessment post-treatment (if applicable):    []  intact    []  redness- no adverse reaction     []redness  adverse reaction:        40 min Therapeutic Exercise:  [x]  See flow sheet   Rationale:      increase ROM, increase strength and improve coordination to improve the patients ability to perform ADLs without pain       10 min Manual Therapy: PROM to R knee.  Passive extension   Rationale:      increase ROM and increase tissue extensibility to improve patient's ability to perform ADLs without pain      Billed With/As:   [x] TE   [] TA   [] Neuro   [] Self Care Patient Education: [x] Review HEP    [] Progressed/Changed HEP based on:   [] positioning   [] body mechanics   [] transfers   [] heat/ice application    [] other:        Other Objective/Functional Measures:    See Recert   Post Treatment Pain Level (on 0 to 10) scale:   0  / 10     ASSESSMENT  Assessment/Changes in Function:     See Recert     []  See Progress Note/Recertification   Patient will continue to benefit from skilled PT services to modify and progress therapeutic interventions, address functional mobility deficits, address ROM deficits and address strength deficits to attain remaining goals.    Progress toward goals / Updated goals:    See Recert     PLAN  []  Upgrade activities as tolerated YES Continue plan of care   []  Discharge due to :    []  Other:      Therapist: Boogie Mckinnon DPT     Date: 8/10/2020 Time: 9:59 AM        Future Appointments   Date Time Provider Christian Mcgill   8/10/2020 10:45 AM Awilda Craven [facility]   8/10/2020  1:00 PM [facility] URODYNAMICS [facility] OpenPM   8/12/2020 10:45 AM Awilda Craven [facility]   8/14/2020 11:30 AM [facility] ULTRASOUND [facility] OpenPM   8/14/2020  2:30 PM Nicole Barakat PT [facility]   8/19/2020 11:30 AM Yasmin Meadows MD [facility] OpenPM

## 2020-08-10 NOTE — PROGRESS NOTES
[address] [facility] PHYSICAL THERAPY AT [address] [facility], [address] [place] [address] Way, [address], [address] [place] [address]  Phone: [phone]  Fax: [phone] THERAPY          Patient Name: Francois Alberto : 1945   Treatment/Medical Diagnosis: Right knee pain [M25.561]   Onset Date: 2020    Referral Source: Angie Styles DO Start of Care [facility]): 2020   Prior Hospitalization: See Medical History Provider #: [id number]   Prior Level of Function: Unlimited standing/walking tolerance, climbed stairs without limitations   Comorbidities: H/o L knee unicompartment arthroplasty, triple bypass, and bladder surgery for cancer   Medications: Verified on Patient Summary List   [facility] from [facility]: 10 Missed Visits: -     Goal/Measure of Progress Goal Met? 1.  Pt to increase FOTO score from 49 to >/= 69   Status at last Eval: 49 Current Status: DNA n/a   2. Pt independent with high level HEP for R knee flexibility, strenghtening/endurance, and proprioception  exercises   Status at last Eval: Not ind Current Status: Not ind no   3. Pt able to climb up/down stairs with reciprocal steps without pain or residual swelling   Status at last Eval: Not reciprocal Current Status: Not reciprocal  no     4. Pt to have R knee PROM of 0- 125 degrees to allow painfree transfers and bike riding   Status at last Eval: Not full Current Status: Not full extension No       Key Functional Changes/Progress: Pt is now ambulating WBAT with no AD. Pt still showing functional decrease in R knee extension (-15deg pre manual , -10deg post manual therapy techniques). Pt showing impaired gait pattern due to mobility deficits. Pt still shows functional weakness of RLE based on impaired squatting mechanics.   Problem List: pain affecting function, decrease ROM, decrease strength, impaired gait/ balance, decrease ADL/ functional abilitiies, decrease activity tolerance, decrease flexibility/ joint mobility and decrease transfer abilities   Treatment Plan may include any combination of the following: Therapeutic exercise, Therapeutic activities, Neuromuscular re-education, Physical agent/modality, Gait/balance training, Manual therapy, Patient education, Self Care training, Functional mobility training, Home safety training and Stair training  Patient Goal(s) has been updated and includes:      Goals for this certification period include and are to be achieved in   4  weeks:  Continue goals above  Frequency / Duration:   Patient to be seen   2   times per week for   6    weeks:    Assessments/Recommendations: Pt to continue PT 2x a week for 6 weeks to improve mobility and strength needed for return to PLOF  If you have any questions/comments please contact us directly at [phone]. Thank you for allowing us to assist in the care of your patient. Therapist Signature: Tr Marcos Date: 5/37/7593   Certification Period:  Reporting Period: 11/11/2020 7/21/2020-8/10/2020 Time: 9:59 AM   NOTE TO PHYSICIAN:  PLEASE COMPLETE THE ORDERS BELOW AND FAX TO   [facility] [place]: [phone]. If you are unable to process this request in 24 hours please contact our office: [phone].    ___ I have read the above report and request that my patient continue as recommended.   ___ I have read the above report and request that my patient continue therapy with the following changes/special instructions: ________________________________________________   ___ I have read the above report and request that my patient be discharged from therapy.      Physician Signature:        Date:       Time:

## 2020-08-12 ENCOUNTER — HOSPITAL ENCOUNTER (OUTPATIENT)
Dept: PHYSICAL THERAPY | Age: 75
Discharge: HOME | End: 2020-08-12
Payer: MEDICARE

## 2020-08-12 PROCEDURE — 97110 THERAPEUTIC EXERCISES: CPT

## 2020-08-12 PROCEDURE — 97140 MANUAL THERAPY 1/> REGIONS: CPT

## 2020-08-12 NOTE — PROGRESS NOTES
PHYSICAL THERAPY - DAILY TREATMENT NOTE    Patient Name: Damian Hernandez        Date: 2020  : 1945    Patient  Verified: YES  Visit #:      16  Insurance: Payor: Gonzalez Jc / Plan: [place] MEDICARE PART A & B / Product Type: Medicare /      In time: 10:45 Out time: 11:45   Total Treatment Time: 60     Medicare Time Tracking (below)   Total Timed Codes (min):  50 1:1 Treatment Time:  40     TREATMENT AREA/ DIAGNOSIS = Right knee pain [M25.561]    SUBJECTIVE  Pain Level (on 0 to 10 scale):  2  / 10   Medication Changes/New allergies or changes in medical history, any new surgeries or procedures? NO    If yes, update Summary List   Subjective Functional Status/Changes:  []  No changes reported     Pt reports difficulty with sit to stand transfers. Pt states pain with other ADLs are easier. OBJECTIVE  Modalities Rationale:     decrease inflammation and decrease pain to improve patient's ability to perform ADLs without pain     min [] Estim, type/location:                                      []  att     []  unatt     []  w/US     []  w/ice    []  w/heat    min []  Mechanical Traction: type/lbs                   []  pro   []  sup   []  int   []  cont    []  before manual    []  after manual    min []  Ultrasound, settings/location:      min []  Iontophoresis w/ dexamethasone, location:                                               []  take home patch       []  in clinic   10 min [x]  Ice     []  Heat    location/position: r knee    min []  Vasopneumatic Device, press/temp:     min []  Other:    [] Skin assessment post-treatment (if applicable):    []  intact    []  redness- no adverse reaction     []redness  adverse reaction:        40 min Therapeutic Exercise:  [x]  See flow sheet   Rationale:      increase strength and improve coordination to improve the patients ability to perform ADLs without pain       10 min Manual Therapy: PROM R knee.  Passive extension with overpressure    Rationale: increase ROM and increase tissue extensibility to improve patient's ability to .perform ADLs without pain    Billed With/As:   [x] TE   [] TA   [] Neuro   [] Self Care Patient Education: [x] Review HEP    [] Progressed/Changed HEP based on:   [] positioning   [] body mechanics   [] transfers   [] heat/ice application    [] other:        Other Objective/Functional Measures:    Extension -9deg   Post Treatment Pain Level (on 0 to 10) scale:   0  / 10     ASSESSMENT  Assessment/Changes in Function:     Pt still requires cueing for proper weight distribution through LEs with sit to stand transfers     []  See Progress Note/Recertification   Patient will continue to benefit from skilled PT services to modify and progress therapeutic interventions, address functional mobility deficits, address ROM deficits, address strength deficits and analyze and address soft tissue restrictions to attain remaining goals.    Progress toward goals / Updated goals:    Good Progress to    [] STG    [x] LTG  1 as shown by improved ADL tolerance     PLAN  [x]  Upgrade activities as tolerated YES Continue plan of care   []  Discharge due to :    []  Other:      Therapist: Lakeshia Osorio DPT     Date: 8/12/2020 Time: 11:13 AM        Future Appointments   Date Time Provider Christian Mcgill   8/14/2020 11:30 AM [facility] ULTRASOUND [facility] Open[facility]   8/14/2020  2:30 PM Ilda Aranda PT [facility]   8/19/2020 11:30 AM Hallie Mendieta MD [facility] Open[facility]

## 2020-08-17 ENCOUNTER — HOSPITAL ENCOUNTER (OUTPATIENT)
Dept: PHYSICAL THERAPY | Age: 75
Discharge: HOME | End: 2020-08-17
Payer: MEDICARE

## 2020-08-17 PROCEDURE — 97110 THERAPEUTIC EXERCISES: CPT

## 2020-08-17 PROCEDURE — 97140 MANUAL THERAPY 1/> REGIONS: CPT

## 2020-08-17 NOTE — PROGRESS NOTES
PHYSICAL THERAPY - DAILY TREATMENT NOTE     Patient Name: Vincenzo Mckeon        Date: 2020  : 1945   YES Patient  Verified  Visit #:     Insurance: Payor: Latha Umana / Plan: [place] MEDICARE PART A & B / Product Type: Medicare /      In time: 3 Out time: 4   Total Treatment Time: 60     Medicare/BCBS Time Tracking (below)   Total Timed Codes (min):  60 1:1 Treatment Time:  60     TREATMENT AREA =  Right knee pain [M25.561]    SUBJECTIVE    Pain Level (on 0 to 10 scale):  2  / 10   Medication Changes/New allergies or changes in medical history, any new surgeries or procedures? NO    If yes, update Summary List   Subjective Functional Status/Changes:  []  No changes reported     Functional improvements: I'm walking better, but it's still not getting all the way straight. Functional impairments: Limited TKE in weightbearing. OBJECTIVE  Modalities Rationale:     decrease inflammation and decrease pain to improve patient's ability to perform pain free ADLs. min [] Estim, type/location:                                      []  att     []  unatt     []  w/US     []  w/ice    []  w/heat    min []  Mechanical Traction: type/lbs                   []  pro   []  sup   []  int   []  cont    []  before manual    []  after manual    min []  Ultrasound, settings/location:      min []  Iontophoresis w/ dexamethasone, location:                                               []  take home patch       []  in clinic   10 min [x]  Ice     []  Heat    location/position: (R) knee, supine. min []  Vasopneumatic Device, press/temp:     min []  Other:    [x] Skin assessment post-treatment (if applicable):    [x]  intact    []  redness- no adverse reaction     []redness  adverse reaction:      35 min Therapeutic Exercise:  [x]  See flow sheet   Rationale:      increase ROM, increase strength and improve coordination to improve the patients ability to perform pain free ADLs.         15 min Manual Therapy: Technique:      [x] S/DTM []IASTM [x]PROM [] Passive Stretching   [x]manual TPR    []Jt manipulation:Gr I [] II []  III [] IV[] V[]  Treatment Area:     Rationale:      decrease pain, increase ROM, increase tissue extensibility and decrease trigger points to improve patient's ability to perform pain free ADLs. Billed With/As:   [x] TE   [] TA   [] Neuro   [] Self Care Patient Education: [x] Review HEP    [] Progressed/Changed HEP based on:   [] positioning   [] body mechanics   [] transfers   [] heat/ice application    [] other:        Other Objective/Functional Measures: Therex per flow sheet. PROM ext w/ manual OP = 0 deg following massage to gastroc/hamstring. Post Treatment Pain Level (on 0 to 10) scale:   0  / 10     ASSESSMENT    Assessment/Changes in Function:     Knee ext PROM improving. Lacking full ext during gait. []  See Progress Note/Recertification   Patient will continue to benefit from skilled PT services to modify and progress therapeutic interventions, address functional mobility deficits, address ROM deficits, address strength deficits, analyze and address soft tissue restrictions, analyze and cue movement patterns, analyze and modify body mechanics/ergonomics and assess and modify postural abnormalities to attain remaining goals. Progress toward goals / Updated goals:    Progressing toward ROM goals.       PLAN    [x]  Upgrade activities as tolerated YES Continue plan of care   []  Discharge due to :    []  Other:      Therapist: Nicole Franz PTA    Date: 8/17/2020 Time: 3:25 PM     Future Appointments   Date Time Provider Christian Mcgill   8/19/2020 11:30 AM Maude Trivedi MD [facility]   8/19/2020  3:30 PM Nai Ar [facility]   8/21/2020 11:45 AM Phu Cheng PT [facility]   8/24/2020  9:30 AM [facility]   8/26/2020 11:30 AM Nai Ar [facility]   8/28/2020  2:00 PM Nai Stubbs [facility]   8[facility]31/2020 11:00 AM [facility] PT [place] [facility]   9/2/2020 10:45 AM [facility]4/2020 11:00 AM Esperanza Somers PT [facility]   9/8/2020 10:45 AM Esperanza Somers PT [facility]   9/9/2020 11:00 AM Esperanza Somers PT [facility]   9/11/2020 12:30 PM May[facility]

## 2020-08-19 ENCOUNTER — HOSPITAL ENCOUNTER (OUTPATIENT)
Dept: PHYSICAL THERAPY | Age: 75
Discharge: HOME | End: 2020-08-19
Payer: MEDICARE

## 2020-08-19 PROBLEM — R73.01 IMPAIRED FASTING GLUCOSE: Status: ACTIVE | Noted: 2019-07-27

## 2020-08-19 PROBLEM — I20.8 OTHER FORMS OF ANGINA PECTORIS (HCC): Status: ACTIVE | Noted: 2019-05-17

## 2020-08-19 PROBLEM — I25.5 ISCHEMIC CARDIOMYOPATHY: Status: ACTIVE | Noted: 2017-10-04

## 2020-08-19 PROBLEM — N17.9 ACUTE RENAL FAILURE (HCC): Status: ACTIVE | Noted: 2020-08-19

## 2020-08-19 PROBLEM — I25.2 HISTORY OF MI (MYOCARDIAL INFARCTION): Status: ACTIVE | Noted: 2019-07-27

## 2020-08-19 PROBLEM — Z95.1 S/P CABG X 2: Status: ACTIVE | Noted: 2018-01-25

## 2020-08-19 PROBLEM — R09.89 LABILE BLOOD PRESSURE: Status: ACTIVE | Noted: 2020-08-19

## 2020-08-19 PROCEDURE — 97140 MANUAL THERAPY 1/> REGIONS: CPT

## 2020-08-19 PROCEDURE — 97110 THERAPEUTIC EXERCISES: CPT

## 2020-08-19 PROCEDURE — 97016 VASOPNEUMATIC DEVICE THERAPY: CPT

## 2020-08-19 NOTE — PROGRESS NOTES
PHYSICAL THERAPY - DAILY TREATMENT NOTE    Patient Name: Dorene Underwood        Date: 2020  : 1945    Patient  Verified: YES  Visit #:   15   of   20  Insurance: Payor: Capri Sow / Plan: [place] MEDICARE PART A & B / Product Type: Medicare /      In time: 3:30 Out time: 4:35   Total Treatment Time: 65     Medicare Time Tracking (below)   Total Timed Codes (min):  55 1:1 Treatment Time:  55     TREATMENT AREA/ DIAGNOSIS = Right knee pain [M25.561]    SUBJECTIVE  Pain Level (on 0 to 10 scale):  0  / 10   Medication Changes/New allergies or changes in medical history, any new surgeries or procedures? NO    If yes, update Summary List   Subjective Functional Status/Changes:  []  No changes reported     Pt reports difficulty walking due to decreased knee extension      OBJECTIVE  Modalities Rationale:     decrease inflammation and decrease pain to improve patient's ability to perform ADLs without pain     min [] Estim, type/location:                                      []  att     []  unatt     []  w/US     []  w/ice    []  w/heat    min []  Mechanical Traction: type/lbs                   []  pro   []  sup   []  int   []  cont    []  before manual    []  after manual    min []  Ultrasound, settings/location:      min []  Iontophoresis w/ dexamethasone, location:                                               []  take home patch       []  in clinic    min []  Ice     []  Heat    location/position:    10 min [x]  Vasopneumatic Device, press/temp: R knee max pressure    min []  Other:    [] Skin assessment post-treatment (if applicable):    []  intact    []  redness- no adverse reaction     []redness  adverse reaction:        45 min Therapeutic Exercise:  [x]  See flow sheet   Rationale:      increase strength and improve coordination to improve the patients ability to perform ADLs without pain       10 min Manual Therapy: PROM knee extension with overpressure.  Prone STM to R hamstring and roller to R hamstring   Rationale:      increase ROM and increase tissue extensibility to improve patient's ability to perform ADLs without pain    Billed With/As:   [x] TE   [] TA   [] Neuro   [] Self Care Patient Education: [x] Review HEP    [] Progressed/Changed HEP based on:   [] positioning   [] body mechanics   [] transfers   [] heat/ice application    [] other:        Other Objective/Functional Measures:    Pt still lacking full knee ext  Initiated SL total gym at [facility] 7: pt able to isolated R LE and had significant fatigue     Post Treatment Pain Level (on 0 to 10) scale:   0  / 10     ASSESSMENT  Assessment/Changes in Function:     Pt required significant cueing to avoid RLE compensations with sit to stands      []  See Progress Note/Recertification   Patient will continue to benefit from skilled PT services to modify and progress therapeutic interventions, address functional mobility deficits, address ROM deficits and address strength deficits to attain remaining goals.    Progress toward goals / Updated goals:    Good Progress to    [] STG    [x] LTG  1 as shown by improved ADL tolerance     PLAN  [x]  Upgrade activities as tolerated YES Continue plan of care   []  Discharge due to :    []  Other:      Therapist: Aj Del Castillo DPT     Date: 8/19/2020 Time: 3:48 PM        Future Appointments   Date Time Provider Christian Mcgill   8/21/2020 11:45 AM Libby Conde PT [facility]   8/24/2020  9:30 AM [facility]   8/26/2020 11:30 AM Suzzette Purpura [facility]   8/28/2020  2:00 PM Suzzette Purpura [facility]   8/31/2020 11:00 AM [facility] PT [facility]   9/2/2020 10:45 AM Suzzette Purpura [facility]   9/4/2020 11:00 AM Libby Conde PT [facility]   9/8/2020 10:45 AM Libby Conde PT [facility]   9/9/2020 11:00 AM Libby Conde PT [facility]   9/11/2020 12:30 PM [facility]   9/23/2020 10:00 AM Ramila Vazquez MD [facility]

## 2020-08-24 ENCOUNTER — APPOINTMENT (OUTPATIENT)
Dept: PHYSICAL THERAPY | Age: 75
End: 2020-08-24
Payer: MEDICARE

## 2020-08-26 ENCOUNTER — HOSPITAL ENCOUNTER (OUTPATIENT)
Dept: PHYSICAL THERAPY | Age: 75
Discharge: HOME | End: 2020-08-26
Payer: MEDICARE

## 2020-08-26 PROCEDURE — 97110 THERAPEUTIC EXERCISES: CPT

## 2020-08-26 PROCEDURE — 97016 VASOPNEUMATIC DEVICE THERAPY: CPT

## 2020-08-26 PROCEDURE — 97140 MANUAL THERAPY 1/> REGIONS: CPT

## 2020-08-26 NOTE — PROGRESS NOTES
PHYSICAL THERAPY - DAILY TREATMENT NOTE    Patient Name: Dmitri Keenan        Date: 2020  : 1945    Patient  Verified: YES  Visit #:   15      20  Insurance: Payor: Chelsea  / Plan: [place] MEDICARE PART A & B / Product Type: Medicare /      In time: 11:30 Out time: 12:35   Total Treatment Time: 65     Medicare Time Tracking (below)   Total Timed Codes (min):  55 1:1 Treatment Time:  55     TREATMENT AREA/ DIAGNOSIS = Right knee pain [M25.561]    SUBJECTIVE  Pain Level (on 0 to 10 scale):  0  / 10   Medication Changes/New allergies or changes in medical history, any new surgeries or procedures? NO    If yes, update Summary List   Subjective Functional Status/Changes:  []  No changes reported     Difficulty walking due to not going straight. Decreased pain overall with other ADLs.     Pt was sore after last visit     OBJECTIVE  Modalities Rationale:     decrease inflammation and decrease pain to improve patient's ability to perform ADLs without pain     min [] Estim, type/location:                                      []  att     []  unatt     []  w/US     []  w/ice    []  w/heat    min []  Mechanical Traction: type/lbs                   []  pro   []  sup   []  int   []  cont    []  before manual    []  after manual    min []  Ultrasound, settings/location:      min []  Iontophoresis w/ dexamethasone, location:                                               []  take home patch       []  in clinic    min []  Ice     []  Heat    location/position:    10 min [x]  Vasopneumatic Device, press/temp: R knee, max compression, 34deg    min []  Other:    [] Skin assessment post-treatment (if applicable):    []  intact    []  redness- no adverse reaction     []redness  adverse reaction:        45 min Therapeutic Exercise:  [x]  See flow sheet   Rationale:      increase strength and improve coordination to improve the patients ability to perform ADLs without pain       10 min Manual Therapy: STM to hamstring and gastroc in prone. Extension with overpressure in supine   Rationale:      increase ROM and increase tissue extensibility to improve patient's ability to perform ADLs without pain      Billed With/As:   [x] TE   [] TA   [] Neuro   [] Self Care Patient Education: [x] Review HEP    [] Progressed/Changed HEP based on:   [] positioning   [] body mechanics   [] transfers   [] heat/ice application    [] other:        Other Objective/Functional Measures:    Ext: -7/-8deg  Increased tonicity of hamstring and gastroc   Post Treatment Pain Level (on 0 to 10) scale:   0  / 10     ASSESSMENT  Assessment/Changes in Function:     Pt still demonstrating decreased RLE step length due to decreased knee extension     []  See Progress Note/Recertification   Patient will continue to benefit from skilled PT services to modify and progress therapeutic interventions, address functional mobility deficits, address ROM deficits, address strength deficits, analyze and address soft tissue restrictions and analyze and cue movement patterns to attain remaining goals.    Progress toward goals / Updated goals:    Good Progress to    [] STG    [x] LTG  1 as shown by improved squatting ability     PLAN  [x]  Upgrade activities as tolerated YES Continue plan of care   []  Discharge due to :    []  Other:      Therapist: Chris SabaDPT     Date: 8/26/2020 Time: 10:05 AM        Future Appointments   Date Time Provider Christian Mcgill   8/26/2020 11:30 AM Felizardo Hatcher [facility]   8/28/2020  2:00 PM Felizardo Hatcher [facility]   8/31/2020 11:00 AM [facility] PT [place] [facility]   9/2/2020 10:45 AM Felizardo Hatcher [facility]   9/4/2020 11:00 AM Dorma Bologna, PT [facility]   9/8/2020 10:45 AM Dorma Bologna, PT [facility]   9/9/2020 11:00 AM Dorma Bologna, PT [facility]   9/11/2020 12:30 PM Sharona Curran [facility]   9/23/2020 10:00 AM Lois Smith MD [facility]

## 2020-08-28 ENCOUNTER — APPOINTMENT (OUTPATIENT)
Dept: PHYSICAL THERAPY | Age: 75
End: 2020-08-28
Payer: MEDICARE

## 2020-08-31 ENCOUNTER — HOSPITAL ENCOUNTER (OUTPATIENT)
Dept: PHYSICAL THERAPY | Age: 75
Discharge: HOME | End: 2020-08-31
Payer: MEDICARE

## 2020-08-31 PROCEDURE — 97140 MANUAL THERAPY 1/> REGIONS: CPT

## 2020-08-31 PROCEDURE — 97110 THERAPEUTIC EXERCISES: CPT

## 2020-08-31 NOTE — PROGRESS NOTES
PHYSICAL THERAPY - DAILY TREATMENT NOTE     Patient Name: Sherif Rodriguez        Date: 2020  : 1945   YES Patient  Verified  Visit #:   15      20  Insurance: Payor: Marcia Alvarezce / Plan: [place] MEDICARE PART A & B / Product Type: Medicare /      In time: 888 Out time:    Total Treatment Time: 60     Medicare/BCBS Time Tracking (below)   Total Timed Codes (min):  50 1:1 Treatment Time:  50     TREATMENT AREA =  Right knee pain [M25.561]    SUBJECTIVE    Pain Level (on 0 to 10 scale):  0   / 10   Medication Changes/New allergies or changes in medical history, any new surgeries or procedures? NO    If yes, update Summary List   Subjective Functional Status/Changes:  []  No changes reported       Functional improvements: I'm walking a little more now. Functional impairments: Pt still lacking full TKE during gait. OBJECTIVE  Modalities Rationale:     decrease inflammation and decrease pain to improve patient's ability to perform pain free ADLs. min [] Estim, type/location:                                      []  att     []  unatt     []  w/US     []  w/ice    []  w/heat    min []  Mechanical Traction: type/lbs                   []  pro   []  sup   []  int   []  cont    []  before manual    []  after manual    min []  Ultrasound, settings/location:      min []  Iontophoresis w/ dexamethasone, location:                                               []  take home patch       []  in clinic   10 min [x]  Ice     []  Heat    location/position: Supine, (R) knee. min []  Vasopneumatic Device, press/temp:     min []  Other:    [x] Skin assessment post-treatment (if applicable):    [x]  intact    []  redness- no adverse reaction     []redness  adverse reaction:      30 min Therapeutic Exercise:  [x]  See flow sheet   Rationale:      increase ROM, increase strength and improve coordination to improve the patients ability to perform pain free ADLs.       10 min Manual Therapy: Technique: [x] S/DTM []IASTM [x]PROM [] Passive Stretching   [x]manual TPR    []Jt manipulation:Gr I [] II []  III [] IV[] V[]  Treatment Area:     Rationale:      decrease pain, increase ROM, increase tissue extensibility and decrease trigger points to improve patient's ability to perform pain free ADLs. Billed With/As:   [x] TE   [] TA   [] Neuro   [] Self Care Patient Education: [x] Review HEP    [] Progressed/Changed HEP based on:   [] positioning   [] body mechanics   [] transfers   [] heat/ice application    [] other:        Other Objective/Functional Measures: Therex per flow sheet. Post Treatment Pain Level (on 0 to 10) scale:   0  / 10     ASSESSMENT  Assessment/Changes in Function:     No exacerbation of symptoms with today's session. []  See Progress Note/Recertification   Patient will continue to benefit from skilled PT services to modify and progress therapeutic interventions, address functional mobility deficits, address ROM deficits, address strength deficits, analyze and address soft tissue restrictions, analyze and cue movement patterns, analyze and modify body mechanics/ergonomics and assess and modify postural abnormalities to attain remaining goals. Progress toward goals / Updated goals:    No change in progress toward LTG's with today's session.       PLAN    [x]  Upgrade activities as tolerated YES Continue plan of care   []  Discharge due to :    []  Other:      Therapist: Jacek Topete PTA    Date: 8/31/2020 Time: 11:13 AM     Future Appointments   Date Time Provider Christian Mcgill   9/2/2020 10:45 AM Derrel Barn [facility]   9/4/2020 11:00 AM Teresa Zuniga PT [facility]   9/8/2020 10:45 AM Teresa Zuniga PT [facility]   9/9/2020 11:00 AM Teresa Zuniga PT [facility]   9/11/2020 12:30 PM Derrel Barn [facility]   9/15/2020 11:00 AM [facility] PT [place] [facility]   9/17/2020 11:00 AM [facility] [place] [facility]   9/23/2020 10:00 AM Vira Davis MD [facility]

## 2020-09-01 ENCOUNTER — APPOINTMENT (OUTPATIENT)
Dept: PHYSICAL THERAPY | Age: 75
End: 2020-09-01
Payer: MEDICARE

## 2020-09-04 ENCOUNTER — HOSPITAL ENCOUNTER (OUTPATIENT)
Dept: PHYSICAL THERAPY | Age: 75
Discharge: HOME | End: 2020-09-04
Payer: MEDICARE

## 2020-09-04 PROCEDURE — 97016 VASOPNEUMATIC DEVICE THERAPY: CPT

## 2020-09-04 PROCEDURE — 97140 MANUAL THERAPY 1/> REGIONS: CPT

## 2020-09-04 PROCEDURE — 97110 THERAPEUTIC EXERCISES: CPT

## 2020-09-04 NOTE — PROGRESS NOTES
PHYSICAL THERAPY - DAILY TREATMENT NOTE    Patient Name: Adriana Rodriguez        Date: 2020  : 1945    Patient  Verified: YES  Visit #:     Insurance: Payor: Kirti Lanza / Plan: [place] MEDICARE PART A & B / Product Type: Medicare /      In time:  Out time: 12:00   Total Treatment Time: 60     Medicare/BCBS Time Tracking (below)   Total Timed Codes (min):  45 1:1 Treatment Time:  45     TREATMENT AREA/ DIAGNOSIS = Right knee pain [M25.561]    SUBJECTIVE  Pain Level (on 0 to 10 scale):  0  / 10   Medication Changes/New allergies or changes in medical history, any new surgeries or procedures?     NO    If yes, update Summary List   Subjective Functional Status/Changes:  []  No changes reported     Functional improvement no pain   Functional limitation  I dont feel secure going down stairs        OBJECTIVE  Modalities Rationale:     decrease edema, decrease inflammation and decrease pain to improve patient's ability to perform ADLs without pain   min [] Estim, type/location:                                      []  att     []  unatt     []  w/US     []  w/ice    []  w/heat    min []  Mechanical Traction: type/lbs                   []  pro   []  sup   []  int   []  cont    []  before manual    []  after manual    min []  Ultrasound, settings/location:      min []  Iontophoresis w/ dexamethasone, location:                                               []  take home patch       []  in clinic    min []  Ice     []  Heat    location/position: 34 degrees, max pressure   15 min [x]  Vasopneumatic Device, press/temp:     min []  Other:    [x] Skin assessment post-treatment (if applicable):    [x]  intact    []  redness- no adverse reaction     []redness  adverse reaction:        10 min Manual Therapy: IASTM/DTM to R post LE, patellar mobs and passive extension   Rationale:      decrease pain, increase ROM and increase tissue extensibility to improve patient's ability to perform ADLs without pain    35 min Therapeutic Exercise:  [x]  See flow sheet   Rationale:      increase ROM, increase strength, improve coordination and improve balance to improve the patients ability to perform ADLs without pain     Billed With/As:   [x] TE   [] TA   [] Neuro   [] Self Care Patient Education: [x] Review HEP    [] Progressed/Changed HEP based on:   [] positioning   [] body mechanics   [] transfers   [] heat/ice application    [] other:        Other Objective/Functional Measures:    Pt ambulating without AD, lacking full R TKE  Good SLR without lag after manaul  Pt with quad weakness that makes eccentric step down difficult, advised to lead with R LE descending stairs    Post Treatment Pain Level (on 0 to 10) scale:   0  / 10     ASSESSMENT  Assessment/Changes in Function:     Mild continued edema and lack of full active extension     []  See Progress Note/Recertification   Patient will continue to benefit from skilled PT services to modify and progress therapeutic interventions, address functional mobility deficits, address ROM deficits, address strength deficits, analyze and address soft tissue restrictions, analyze and cue movement patterns, analyze and modify body mechanics/ergonomics and assess and modify postural abnormalities to attain remaining goals.    Progress toward goals / Updated goals:    Good deep squat and SLR without lag     PLAN  [x]  Upgrade activities as tolerated YES Continue plan of care   []  Discharge due to :    []  Other:      Therapist: Breanna Langley PT    Date: 9/4/2020 Time: 11:51 AM     Future Appointments   Date Time Provider Christian Mcgill   9/8/2020 10:45 AM Phu Cheng PT [facility]   9/9/2020 11:00 AM Phu Cheng PT [facility]   9/11/2020 12:30 PM Nai Stubbs [facility]   9/15/2020 11:00 AM [facility] PT [place] [facility]   9/17/2020 11:00 AM [facility] PT [place] [address] [facility]   9/23/2020 10:00 AM Maude Trivedi MD [facility]

## 2020-09-08 ENCOUNTER — HOSPITAL ENCOUNTER (OUTPATIENT)
Dept: PHYSICAL THERAPY | Age: 75
Discharge: HOME | End: 2020-09-08
Payer: MEDICARE

## 2020-09-08 PROCEDURE — 97016 VASOPNEUMATIC DEVICE THERAPY: CPT

## 2020-09-08 PROCEDURE — 97110 THERAPEUTIC EXERCISES: CPT

## 2020-09-08 PROCEDURE — 97140 MANUAL THERAPY 1/> REGIONS: CPT

## 2020-09-08 NOTE — PROGRESS NOTES
PHYSICAL THERAPY - DAILY TREATMENT NOTE    Patient Name: Sheryle Sato        Date: 2020  : 1945    Patient  Verified: YES  Visit #:     Insurance: Payor: Sherren Dallas / Plan: [place] MEDICARE PART A & B / Product Type: Medicare /      In time: 11 Out time: 11:55   Total Treatment Time: 55     Medicare/BCBS Time Tracking (below)   Total Timed Codes (min):  45 1:1 Treatment Time:  45     TREATMENT AREA/ DIAGNOSIS = Right knee pain [M25.561]    SUBJECTIVE  Pain Level (on 0 to 10 scale):  2  / 10   Medication Changes/New allergies or changes in medical history, any new surgeries or procedures? NO    If yes, update Summary List   Subjective Functional Status/Changes:  []  No changes reported     Functional improvement less pain   Functional limitation  I have pain going down the stairs        OBJECTIVE  Modalities Rationale:     decrease edema, decrease inflammation and decrease pain to improve patient's ability to perform ADLs without pain   min [] Estim, type/location:                                      []  att     []  unatt     []  w/US     []  w/ice    []  w/heat    min []  Mechanical Traction: type/lbs                   []  pro   []  sup   []  int   []  cont    []  before manual    []  after manual    min []  Ultrasound, settings/location:      min []  Iontophoresis w/ dexamethasone, location:                                               []  take home patch       []  in clinic    min []  Ice     []  Heat    location/position:    10 min [x]  Vasopneumatic Device, press/temp: 34 degrees, max pressure    min []  Other:    [x] Skin assessment post-treatment (if applicable):    [x]  intact    []  redness- no adverse reaction     []redness  adverse reaction:        10 min Manual Therapy: IASTM/DTM to post R LE.  Patellar mobs and passive knee extension   Rationale:      decrease pain, increase ROM and increase tissue extensibility to improve patient's ability to perform ADLs without pain    35 min Therapeutic Exercise:  [x]  See flow sheet   Rationale:      increase ROM, increase strength and improve balance to improve the patients ability to perform ADLs without pain     Billed With/As:   [x] TE   [] TA   [] Neuro   [] Self Care Patient Education: [x] Review HEP    [] Progressed/Changed HEP based on:   [] positioning   [] body mechanics   [] transfers   [] heat/ice application    [] other:        Other Objective/Functional Measures:    Pt lacking full extension with gait  Pt able to do SLR without lag with full extension after manual and QSs     Post Treatment Pain Level (on 0 to 10) scale:   0  / 10     ASSESSMENT  Assessment/Changes in Function:     Lacking full extension to start each visit, but able to achieve full extension each visit     []  See Progress Note/Recertification   Patient will continue to benefit from skilled PT services to modify and progress therapeutic interventions, address functional mobility deficits, address ROM deficits, address strength deficits, analyze and address soft tissue restrictions, analyze and cue movement patterns, analyze and modify body mechanics/ergonomics and assess and modify postural abnormalities to attain remaining goals.    Progress toward goals / Updated goals:    Less pain     PLAN  [x]  Upgrade activities as tolerated YES Continue plan of care   []  Discharge due to :    []  Other:      Therapist: Sylvester Ramon PT    Date: 9/8/2020 Time: 12:29 PM     Future Appointments   Date Time Provider Christian Mcgill   9/9/2020 11:00 AM Bryant Gracia PT [facility]   9/11/2020 12:30 PM [facility]   9/15/2020 11:00 AM [facility] PT [place] [facility]   9/17/2020 11:00 AM [facility] PT [place] [address] [facility]   9/23/2020 10:00 AM Liliane Marcos MD [facility]

## 2020-09-09 ENCOUNTER — HOSPITAL ENCOUNTER (OUTPATIENT)
Dept: PHYSICAL THERAPY | Age: 75
Discharge: HOME | End: 2020-09-09
Payer: MEDICARE

## 2020-09-09 PROCEDURE — 97110 THERAPEUTIC EXERCISES: CPT

## 2020-09-09 PROCEDURE — 97016 VASOPNEUMATIC DEVICE THERAPY: CPT

## 2020-09-09 PROCEDURE — 97140 MANUAL THERAPY 1/> REGIONS: CPT

## 2020-09-09 NOTE — PROGRESS NOTES
PHYSICAL THERAPY - DAILY TREATMENT NOTE    Patient Name: Te Fuentes        Date: 2020  : 1945    Patient  Verified: YES  Visit #:     Insurance: Payor: Radhika Feeler / Plan: [place] MEDICARE PART A & B / Product Type: Medicare /      In time: 11 Out time: 11:55   Total Treatment Time: 55     Medicare/BCBS Time Tracking (below)   Total Timed Codes (min):  50 1:1 Treatment Time:  50     TREATMENT AREA/ DIAGNOSIS = Right knee pain [M25.561]    SUBJECTIVE  Pain Level (on 0 to 10 scale):  2  / 10   Medication Changes/New allergies or changes in medical history, any new surgeries or procedures?     NO    If yes, update Summary List   Subjective Functional Status/Changes:  []  No changes reported     Functional improvement less pain   Functional limitation  Pain going down stairs        OBJECTIVE  Modalities Rationale:     decrease edema, decrease inflammation and decrease pain to improve patient's ability to perform ADLs without pain   min [] Estim, type/location:                                      []  att     []  unatt     []  w/US     []  w/ice    []  w/heat    min []  Mechanical Traction: type/lbs                   []  pro   []  sup   []  int   []  cont    []  before manual    []  after manual    min []  Ultrasound, settings/location:      min []  Iontophoresis w/ dexamethasone, location:                                               []  take home patch       []  in clinic    min []  Ice     []  Heat    location/position:    5 min [x]  Vasopneumatic Device, press/temp: 34 degrees, max pressure    min []  Other:    [x] Skin assessment post-treatment (if applicable):    [x]  intact    []  redness- no adverse reaction     []redness  adverse reaction:        10 min Manual Therapy: IASTM/DTM to R post LE, patellar mobs, and passive ext   Rationale:      decrease pain, increase ROM and increase tissue extensibility to improve patient's ability to perform ADLs without pain    40 min Therapeutic Exercise:  [x]  See flow sheet   Rationale:      increase ROM and increase strength to improve the patients ability to perform ADLs without pain     Billed With/As:   [x] TE   [] TA   [] Neuro   [] Self Care Patient Education: [x] Review HEP    [] Progressed/Changed HEP based on:   [] positioning   [] body mechanics   [] transfers   [] heat/ice application    [] other:        Other Objective/Functional Measures:    See 701/recert   Post Treatment Pain Level (on 0 to 10) scale:   1  / 10     ASSESSMENT  Assessment/Changes in Function:     See 701/recert     [x]  See Progress Note/Recertification   Patient will continue to benefit from skilled PT services to modify and progress therapeutic interventions, address functional mobility deficits, address ROM deficits, address strength deficits, analyze and address soft tissue restrictions, analyze and cue movement patterns and analyze and modify body mechanics/ergonomics to attain remaining goals.    Progress toward goals / Updated goals:    See 701/recert     PLAN  [x]  Upgrade activities as tolerated YES Continue plan of care   []  Discharge due to :    []  Other:      Therapist: Karo Alvarado PT    Date: 9/9/2020 Time: 11:52 AM     Future Appointments   Date Time Provider Christian Mcgill   9/11/2020 12:30 PM [facility]   9/15/2020 11:00 AM [facility] PT [address] [facility]   9/17/2020 11:00 AM [facility] PT [place] [address] [facility]   9/23/2020 10:00 AM Kevon Easley MD [facility]

## 2020-09-09 NOTE — PROGRESS NOTES
[address] [facility] PHYSICAL THERAPY AT [address] 95 [facility], [address] [place] [address], [address], [address] [place] [address]  Phone: [phone]  Fax: [phone] THERAPY          Patient Name: Rudy Trevizo : 1945   Treatment/Medical Diagnosis: Right knee pain [M25.561]   Onset Date: 2020    Referral Source: Abiodun Negron DO Start of Care [facility]): 2020   Prior Hospitalization: See Medical History Provider #: [id number]   Prior Level of Function: Unlimited standing/walking tolerance, climbed stairs without limitations   Comorbidities: H/o L knee unicompartment arthroplasty, triple bypass, and bladder surgery for cancer   Medications: Verified on Patient Summary List   Visits from [facility]: 18 Missed Visits: -     Goal/Measure of Progress Goal Met? 1.  Pt to increase FOTO score from 49 to >/= 69   Status at last Eval: 49 Current Status: DNA n/a   2. Pt independent with high level HEP for R knee flexibility, strenghtening/endurance, and proprioception  exercises   Status at last Eval: Not ind Current Status: Now independent Yes   3. Pt able to climb up/down stairs with reciprocal steps without pain or residual swelling   Status at last Eval: Not reciprocal Current Status:  reciprocal gait ascending stairs but still needing to use step to gait to descend stairs no     4. Pt to have R knee PROM of 0- 125 degrees to allow painfree transfers and bike riding   Status at last Eval: Not full extension Current Status: Full active extension after manual therapy, but not walking with full TKE. Active flexion to 125 degrees  progressing       Key Functional Changes/Progress: Pt is now ambulating WBAT with no AD, but still unable to walk with full R TKE.     Problem List: pain affecting function, decrease ROM, decrease strength, impaired gait/ balance, decrease ADL/ functional abilitiies, decrease activity tolerance, decrease flexibility/ joint mobility and decrease transfer abilities   Treatment Plan may include any combination of the following: Therapeutic exercise, Therapeutic activities, Neuromuscular re-education, Physical agent/modality, Gait/balance training, Manual therapy, Patient education, Self Care training, Functional mobility training, Home safety training and Stair training  Patient Goal(s) has been updated and includes:  Walk better    Goals for this certification period include and are to be achieved in   4  weeks:  1. Increase FOTo score to > 60, to indicate increased function  2. Increase strength to allow recipricol gait descending stairs  3. Full R knee  TKE gait  Frequency / Duration:   Patient to be seen   2   times per week for   6    weeks:    Assessments/Recommendations: Pt to continue PT 2x a week for 6 weeks to improve mobility and strength needed for return to PLOF  If you have any questions/comments please contact us directly at [phone]. Thank you for allowing us to assist in the care of your patient. Therapist Signature: Santino Shin PT Date: 6/2/3748   Certification Period:  Reporting Period: 9/9/20-12/9/20  8/10/2020-9/9/20 Time: 9:59 AM   NOTE TO PHYSICIAN:  PLEASE COMPLETE THE ORDERS BELOW AND FAX TO   [facility] [place]: [phone]. If you are unable to process this request in 24 hours please contact our office: [phone].    ___ I have read the above report and request that my patient continue as recommended.   ___ I have read the above report and request that my patient continue therapy with the following changes/special instructions: ________________________________________________   ___ I have read the above report and request that my patient be discharged from therapy.      Physician Signature:        Date:       Time:

## 2020-09-11 ENCOUNTER — HOSPITAL ENCOUNTER (OUTPATIENT)
Dept: PHYSICAL THERAPY | Age: 75
Discharge: HOME | End: 2020-09-11
Payer: MEDICARE

## 2020-09-11 PROCEDURE — 97110 THERAPEUTIC EXERCISES: CPT

## 2020-09-11 PROCEDURE — 97140 MANUAL THERAPY 1/> REGIONS: CPT

## 2020-09-11 PROCEDURE — 97016 VASOPNEUMATIC DEVICE THERAPY: CPT

## 2020-09-11 NOTE — PROGRESS NOTES
PHYSICAL THERAPY - DAILY TREATMENT NOTE    Patient Name: Willy Lerma        Date: 2020  : 1945    Patient  Verified: YES  Visit #:     Insurance: Payor: Kaylen Gal / Plan: [place] MEDICARE PART A & B / Product Type: Medicare /      In time: 12:30 Out time: 1:30   Total Treatment Time: 60     Medicare Time Tracking (below)   Total Timed Codes (min):  50 1:1 Treatment Time:  40     TREATMENT AREA/ DIAGNOSIS = Right knee pain [M25.561]    SUBJECTIVE  Pain Level (on 0 to 10 scale):  2  / 10   Medication Changes/New allergies or changes in medical history, any new surgeries or procedures? NO    If yes, update Summary List   Subjective Functional Status/Changes:  []  No changes reported     Pt reports difficulty with descending stairs because of lateral knee pain. Better walking      OBJECTIVE  Modalities Rationale:     decrease inflammation and decrease pain to improve patient's ability to perform ADLs without pain     min [] Estim, type/location:                                      []  att     []  unatt     []  w/US     []  w/ice    []  w/heat    min []  Mechanical Traction: type/lbs                   []  pro   []  sup   []  int   []  cont    []  before manual    []  after manual    min []  Ultrasound, settings/location:      min []  Iontophoresis w/ dexamethasone, location:                                               []  take home patch       []  in clinic    min []  Ice     []  Heat    location/position:    10 min [x]  Vasopneumatic Device, press/temp: R knee    min []  Other:    [] Skin assessment post-treatment (if applicable):    []  intact    []  redness- no adverse reaction     []redness  adverse reaction:        40 min Therapeutic Exercise:  [x]  See flow sheet   Rationale:      increase strength and improve coordination to improve the patients ability to perform ADLs without pain       10 min Manual Therapy: STM to hamstring. Passive extension.     Rationale:      decrease pain, increase ROM and increase tissue extensibility to improve patient's ability to perform ADLs without pain      Billed With/As:   [x] TE   [] TA   [] Neuro   [] Self Care Patient Education: [x] Review HEP    [] Progressed/Changed HEP based on:   [] positioning   [] body mechanics   [] transfers   [] heat/ice application    [] other:        Other Objective/Functional Measures:    Pt still lacking full extension. Able to obtain after manual therapy techniques   Post Treatment Pain Level (on 0 to 10) scale:   0  / 10     ASSESSMENT  Assessment/Changes in Function:     Improved LE strength based on decreased compensations     []  See Progress Note/Recertification   Patient will continue to benefit from skilled PT services to modify and progress therapeutic interventions, address functional mobility deficits, address ROM deficits, address strength deficits, analyze and address soft tissue restrictions and analyze and cue movement patterns to attain remaining goals.    Progress toward goals / Updated goals:    Good Progress to    [] STG    [x] LTG  1 as shown by improved strength needed for ADLs     PLAN  [x]  Upgrade activities as tolerated YES Continue plan of care   []  Discharge due to :    []  Other:      Therapist: Abhishek Marroquin DPT     Date: 9/11/2020 Time: 12:42 PM        Future Appointments   Date Time Provider Christian Mcgill   9/15/2020 11:00 AM [facility] PT [address] [facility]   9/17/2020 11:00 AM [facility] PT [place] [facility]   9/23/2020 10:00 AM Ashley Matos MD [facility]

## 2020-09-15 ENCOUNTER — HOSPITAL ENCOUNTER (OUTPATIENT)
Dept: PHYSICAL THERAPY | Age: 75
Discharge: HOME | End: 2020-09-15
Payer: MEDICARE

## 2020-09-15 PROCEDURE — 97016 VASOPNEUMATIC DEVICE THERAPY: CPT

## 2020-09-15 PROCEDURE — 97110 THERAPEUTIC EXERCISES: CPT

## 2020-09-15 PROCEDURE — 97140 MANUAL THERAPY 1/> REGIONS: CPT

## 2020-09-15 NOTE — PROGRESS NOTES
PHYSICAL THERAPY - DAILY TREATMENT NOTE     Patient Name: John Styles        Date: 9/15/2020  : 1945   YES Patient  Verified  Visit #:     Insurance: Payor: Álvaro Moraima / Plan: [place] MEDICARE PART A & B / Product Type: Medicare /      In time: 11 Out time: 12   Total Treatment Time: 60     Medicare/BCBS Time Tracking (below)   Total Timed Codes (min):  60 1:1 Treatment Time:  60     TREATMENT AREA =  Right knee pain [M25.561]    SUBJECTIVE    Pain Level (on 0 to 10 scale):  0  / 10   Medication Changes/New allergies or changes in medical history, any new surgeries or procedures? NO    If yes, update Summary List   Subjective Functional Status/Changes:  []  No changes reported       Functional improvements: I had a breakthrough - I walked down the stairs without a hitch. Functional impairments: Still won't go all the way straight while I'm walking. OBJECTIVE  Modalities Rationale:     decrease inflammation and decrease pain to improve patient's ability to perform pain free ADLs. min [] Estim, type/location:                                      []  att     []  unatt     []  w/US     []  w/ice    []  w/heat    min []  Mechanical Traction: type/lbs                   []  pro   []  sup   []  int   []  cont    []  before manual    []  after manual    min []  Ultrasound, settings/location:      min []  Iontophoresis w/ dexamethasone, location:                                               []  take home patch       []  in clinic    min []  Ice     []  Heat    location/position:    10 min [x]  Vasopneumatic Device, press/temp: (R) knee. min []  Other:    [x] Skin assessment post-treatment (if applicable):    [x]  intact    []  redness- no adverse reaction     []redness  adverse reaction:      35 min Therapeutic Exercise:  [x]  See flow sheet   Rationale:      increase ROM, increase strength and improve coordination to improve the patients ability to perform pain free ADLs. 15 min Manual Therapy: Technique:      [x] S/DTM []IASTM [x]PROM [] Passive Stretching   [x]manual TPR    []Jt manipulation:Gr I [] II []  III [] IV[] V[]  Treatment Area:     Rationale:      decrease pain, increase ROM, increase tissue extensibility and decrease trigger points to improve patient's ability to perform pain free ADLs. Billed With/As:   [x] TE   [] TA   [] Neuro   [] Self Care Patient Education: [x] Review HEP    [] Progressed/Changed HEP based on:   [] positioning   [] body mechanics   [] transfers   [] heat/ice application    [] other:        Other Objective/Functional Measures:     Knee PROM = -2 - 143 deg      Post Treatment Pain Level (on 0 to 10) scale:   0  / 10     ASSESSMENT    Assessment/Changes in Function:     Pt demonstrating steady improvement in knee ROM. []  See Progress Note/Recertification   Patient will continue to benefit from skilled PT services to modify and progress therapeutic interventions, address functional mobility deficits, address ROM deficits, address strength deficits, analyze and address soft tissue restrictions, analyze and cue movement patterns, analyze and modify body mechanics/ergonomics and assess and modify postural abnormalities to attain remaining goals. Progress toward goals / Updated goals:    LTG #4 met. Good progress toward LTG #3.       PLAN    [x]  Upgrade activities as tolerated YES Continue plan of care   []  Discharge due to :    []  Other:      Therapist: Mone Arora PTA    Date: 9/15/2020 Time: 1:02 PM     Future Appointments   Date Time Provider Christian Mcgill   9/17/2020 11:00 AM [facility] [place] [facility]21/2020  3:00 PM [facility] [place] [facility]   9/23/2020 10:00 AM Hany Acosta MD [facility]   9/23/2020  1:30 PM Dana Gonzales PT [facility]   9/25/2020 11:15 AM Dana Gonzales PT [facility]   9/28/2020 10:15 AM [facility] PT [place] [facility]   9/30/2020 11:30 AM [facility]   10/2/2020  1:15 PM Rosa Mendoza [facility]   10/5/2020 11:00 AM [facility] [place] [facility]   10/7/2020 11:15 AM Monica Brooke, CAR[facility]   10/9/2020 11:15 AM Monica Brooke, PT [facility]   10/12/2020 11:00 AM [facility] [place] [facility]   10/14/2020 11:30 AM Maria Victoria Holloway [facility]   10/16/2020 11:15 AM [facility]erre[facility]

## 2020-09-17 ENCOUNTER — HOSPITAL ENCOUNTER (OUTPATIENT)
Dept: PHYSICAL THERAPY | Age: 75
Discharge: HOME | End: 2020-09-17
Payer: MEDICARE

## 2020-09-17 PROCEDURE — 97140 MANUAL THERAPY 1/> REGIONS: CPT

## 2020-09-17 PROCEDURE — 97110 THERAPEUTIC EXERCISES: CPT

## 2020-09-17 NOTE — PROGRESS NOTES
PHYSICAL THERAPY - DAILY TREATMENT NOTE     Patient Name: Sherif Rodriguez        Date: 2020  : 1945   YES Patient  Verified  Visit #:     Insurance: Payor: Marcia Barkley / Plan: [place] MEDICARE PART A & B / Product Type: Medicare /      In time: 7489 Out time:    Total Treatment Time: 50     Medicare/BCBS Time Tracking (below)   Total Timed Codes (min):  50 1:1 Treatment Time:  50     TREATMENT AREA =  Right knee pain [M25.561]    SUBJECTIVE    Pain Level (on 0 to 10 scale):  0  / 10   Medication Changes/New allergies or changes in medical history, any new surgeries or procedures? NO    If yes, update Summary List   Subjective Functional Status/Changes:  []  No changes reported       Functional improvements: I feel like I'm walking better  Functional impairments: na          OBJECTIVE  35 min Therapeutic Exercise:  [x]  See flow sheet   Rationale:      increase ROM, increase strength, improve coordination and improve balance to improve the patients ability to perform pain free ADLs. 15 min Manual Therapy: Technique:      [x] S/DTM []IASTM [x]PROM [] Passive Stretching   [x]manual TPR    []Jt manipulation:Gr I [] II []  III [] IV[] V[]  Treatment Area:     Rationale:      decrease pain, increase ROM, increase tissue extensibility and decrease trigger points to improve patient's ability to perform pain free ADLs. Billed With/As:   [x] TE   [] TA   [] Neuro   [] Self Care Patient Education: [x] Review HEP    [] Progressed/Changed HEP based on:   [] positioning   [] body mechanics   [] transfers   [] heat/ice application    [] other:        Other Objective/Functional Measures: Therex per flow sheet. Post Treatment Pain Level (on 0 to 10) scale:   0   / 10     ASSESSMENT    Assessment/Changes in Function:     No exacerbation of symptoms with today's session.       []  See Progress Note/Recertification   Patient will continue to benefit from skilled PT services to modify and progress therapeutic interventions, address functional mobility deficits, address ROM deficits, address strength deficits, analyze and address soft tissue restrictions, analyze and cue movement patterns, analyze and modify body mechanics/ergonomics and assess and modify postural abnormalities to attain remaining goals. Progress toward goals / Updated goals:    Good progress toward LTG #3.       PLAN    [x]  Upgrade activities as tolerated YES Continue plan of care   []  Discharge due to :    []  Other:      Therapist: Frankie Carrel, PTA    Date: 9/17/2020 Time: 11:51 AM     Future Appointments   Date Time Provider Christian Mcgill   9/21/2020  3:00 PM [facility] [place] [facility]   9/23/2020 10:00 AM Cathy Urbano MD [facility]   9/23/2020  1:30 PM Evaristo Estrella PT [facility]   9/25/2020 11:15 AM Evaristo Estrella PT [facility]   9/28/2020 10:15 AM [facility] PT [place] [facility]   9/30/2020 11:30 AM Rooney Axon [facility]   10/2/2020  1:15 PM Rooney Axon [facility]   10/5/2020 11:00 AM [facility] PT [place] [facility]   10/7/2020 11:15 AM Evaristo Estrella PT [facility]   10/9/2020 11:15 AM Evaristo Estrella PT [facility]   10/12/2020 11:00 AM [facility] PT [place] [facility]   10/14/2020 11:30 AM Bernardino [facility]on [facility]   10/16/2020 11:15 AM Evaristo Estrella PT [facility]

## 2020-09-21 ENCOUNTER — HOSPITAL ENCOUNTER (OUTPATIENT)
Dept: PHYSICAL THERAPY | Age: 75
Discharge: HOME | End: 2020-09-21
Payer: MEDICARE

## 2020-09-21 PROCEDURE — 97140 MANUAL THERAPY 1/> REGIONS: CPT

## 2020-09-21 PROCEDURE — 97110 THERAPEUTIC EXERCISES: CPT

## 2020-09-21 NOTE — PROGRESS NOTES
PHYSICAL THERAPY - DAILY TREATMENT NOTE     Patient Name: Damian Hernandez        Date: 2020  : 1945   YES Patient  Verified  Visit #:     Insurance: Payor: Gonzalez Jc / Plan: VA MEDICARE PART A & B / Product Type: Medicare /      In time: 305 Out time: 405   Total Treatment Time: 60     Medicare/BCBS Time Tracking (below)   Total Timed Codes (min):  50 1:1 Treatment Time:  50     TREATMENT AREA =  Right knee pain [M25.561]    SUBJECTIVE    Pain Level (on 0 to 10 scale):  0  / 10   Medication Changes/New allergies or changes in medical history, any new surgeries or procedures? NO    If yes, update Summary List   Subjective Functional Status/Changes:  []  No changes reported     Functional improvements:   Functional impairments: I go back to the MD on Thursday. Irish Shane going to take picture to see if there's anything blocking the knee. OBJECTIVE  Modalities Rationale:     decrease inflammation and decrease pain to improve patient's ability to perform pain free ADLs. min [] Estim, type/location:                                      []  att     []  unatt     []  w/US     []  w/ice    []  w/heat    min []  Mechanical Traction: type/lbs                   []  pro   []  sup   []  int   []  cont    []  before manual    []  after manual    min []  Ultrasound, settings/location:      min []  Iontophoresis w/ dexamethasone, location:                                               []  take home patch       []  in clinic   10 min [x]  Ice     []  Heat    location/position: Supine, (R) knee. min []  Vasopneumatic Device, press/temp:     min []  Other:    [x] Skin assessment post-treatment (if applicable):    [x]  intact    []  redness- no adverse reaction     []redness  adverse reaction:      35 min Therapeutic Exercise:  [x]  See flow sheet   Rationale:      increase ROM, increase strength and improve coordination to improve the patients ability to perform pain free ADLs.       15 min Manual Therapy: Technique:      [x] S/DTM []IASTM [x]PROM [x] Passive Stretching   [x]manual TPR    []Jt manipulation:Gr I [] II []  III [] IV[] V[]  Treatment Area:     Rationale:      decrease pain, increase ROM, increase tissue extensibility and decrease trigger points to improve patient's ability to perform pain free ADLs. Billed With/As:   [x] TE   [] TA   [] Neuro   [] Self Care Patient Education: [x] Review HEP    [] Progressed/Changed HEP based on:   [] positioning   [] body mechanics   [] transfers   [] heat/ice application    [] other:        Other Objective/Functional Measures: Therex per flow sheet. Post Treatment Pain Level (on 0 to 10) scale:   0  / 10     ASSESSMENT    Assessment/Changes in Function:     No exacerbation of symptoms with today's session. []  See Progress Note/Recertification   Patient will continue to benefit from skilled PT services to modify and progress therapeutic interventions, address functional mobility deficits, address ROM deficits, address strength deficits, analyze and address soft tissue restrictions, analyze and cue movement patterns, analyze and modify body mechanics/ergonomics and assess and modify postural abnormalities to attain remaining goals. Progress toward goals / Updated goals:    No change in progress toward LTG's with today's session.       PLAN    [x]  Upgrade activities as tolerated YES Continue plan of care   []  Discharge due to :    []  Other:      Therapist: Tru Zhao PTA    Date: 9/21/2020 Time: 3:28 PM     Future Appointments   Date Time Provider Christian Mcgill   9/23/2020 10:00 AM Kevon Easley MD [facility]   9/23/2020  1:30 PM Nigel Valentine PT [facility]   9/25/2020 11:15 AM Nigel Valentine PT [facility]   9/28/2020 10:15 AM [facility] PT [place] [facility]   9/30/2020 11:30 AM Percival Kitchen [facility]   10/2/2020  1:15 PM [facility]   10/5/2020 11:00 AM [facility] PT [place] [facility]   10/7/2020 11:15 AM Luz Shaffer, PT [facility]   10/9/2020 11:15 AM Luz Shaffer, PT [facility]   10/12/2020 11:00 AM [facility] PT [place] [facility]   10/14/2020 11:[facility]   10/16/2020 11:15 AM Sheba Guerrero, PT [facility]

## 2020-09-23 ENCOUNTER — HOSPITAL ENCOUNTER (OUTPATIENT)
Dept: PHYSICAL THERAPY | Age: 75
Discharge: HOME | End: 2020-09-23
Payer: MEDICARE

## 2020-09-23 PROCEDURE — 97140 MANUAL THERAPY 1/> REGIONS: CPT

## 2020-09-23 PROCEDURE — 97110 THERAPEUTIC EXERCISES: CPT

## 2020-09-23 PROCEDURE — 97016 VASOPNEUMATIC DEVICE THERAPY: CPT

## 2020-09-23 NOTE — PROGRESS NOTES
PHYSICAL THERAPY - DAILY TREATMENT NOTE     Patient Name: Cadence Branch        Date: 2020  : 1945   YES Patient  Verified  Visit #:     Insurance: Payor: Rubin Bloch / Plan: [place] MEDICARE PART A & B / Product Type: Medicare /      In time: 130 Out time: 230   Total Treatment Time: 60     Medicare/BCBS Time Tracking (below)   Total Timed Codes (min):  50 1:1 Treatment Time:  50     TREATMENT AREA =  Right knee pain [M25.561]    SUBJECTIVE    Pain Level (on 0 to 10 scale):  0  / 10   Medication Changes/New allergies or changes in medical history, any new surgeries or procedures? NO    If yes, update Summary List   Subjective Functional Status/Changes:  []  No changes reported   Pt reports ROM has improved, noting reduced difficulty with stairs and amb. However, he reports limitation in ability to amb for distance secondary to limited ability to fully extend knee. OBJECTIVE  Modalities Rationale:     decrease inflammation and decrease pain to improve patient's ability to perform pain free ADLs.        min [] Estim, type/location:                                      []  att     []  unatt     []  w/US     []  w/ice    []  w/heat    min []  Mechanical Traction: type/lbs                   []  pro   []  sup   []  int   []  cont    []  before manual    []  after manual    min []  Ultrasound, settings/location:      min []  Iontophoresis w/ dexamethasone, location:                                               []  take home patch       []  in clinic     min []  Ice     []  Heat    location/position:      10 min [x]  Vasopneumatic Device, press/temp: R knee in supine    min []  Other:    [x] Skin assessment post-treatment (if applicable):    [x]  intact    []  redness- no adverse reaction     []redness  adverse reaction:      35 min Therapeutic Exercise:  [x]  See flow sheet   Rationale:      increase ROM, increase strength and improve coordination to improve the patients ability to perform ADLs, IADLs      15 min Manual Therapy: Technique:      [x] S/DTM []IASTM [x]PROM [x] Passive Stretching   [x]manual TPR    [x]Jt  manipulation:Gr I [x] II [x]  III [x] IV[x] V[]  Treatment Area:  Hamstrings release, Patellar and Tibiofemoral for knee ext   Rationale:      decrease pain, increase ROM, increase tissue extensibility and decrease trigger points to improve patient's ability to perform pain free ADLs. Billed With/As:   [x] TE   [] TA   [] Neuro   [] Self Care Patient Education: [x] Review HEP    [] Progressed/Changed HEP based on:   [] positioning   [] body mechanics   [] transfers   [] heat/ice application    [] other:        Other Objective/Functional Measures:  Pt with AROM lacking 10 degrees ext prior to manual tx. PROM to 0. Ed pt on importance of extension ROM, stretching for HEP in longsitting or supine. Ed on gastroc and hamstrings stretching. Chandrika Thomas Post Treatment Pain Level (on 0 to 10) scale:   0  / 10     ASSESSMENT    Assessment/Changes in Function:     Pt with ability to perform hamstring and gastroc stretching with min A.     []  See Progress Note/Recertification   Patient will continue to benefit from skilled PT services to modify and progress therapeutic interventions, address functional mobility deficits, address ROM deficits, address strength deficits, analyze and address soft tissue restrictions, analyze and cue movement patterns, analyze and modify body mechanics/ergonomics and assess and modify postural abnormalities to attain remaining goals. Progress toward goals / Updated goals:    Pt with limited extension, ed on HEP and its progression to improve ROM.      PLAN    [x]  Upgrade activities as tolerated YES Continue plan of care   []  Discharge due to :    []  Other:      Therapist: Kathryn French, PT    Date: 9/23/2020 Time: 3:28 PM     Future Appointments   Date Time Provider Christian Mcgill   9/23/2020  1:30 PM Nigel Valentine PT [facility]   9/25/2020 11:15 AM Luiz Whyte Vicente Farris [facility]   9/28/2020 10:15 AM [facility] PT [place] [facility]30/2020 11:30 AM [facility]   10/2/2020  1:15 PM [facility]   10/5/2020 11:00 AM [facility] PT [place] [address] [facility]   10/7/2020 11:15 AM Kaitlynn Cote PT [facility]   10/9/2020 11:15 AM Kaitlynn Cote PT [facility]   10/12/2020 11:00 AM [facility] PT [address] [facility]   10/14/2020 11:30 AM [facility]   10/16/2020 11:15 AM Kaitlynn Cote PT [facility]   11/23/2020 11:30 AM Leah Anderson MD [facility]   3/24/2021 11:30 AM Eletha Peabody, MD [facility]

## 2020-09-25 ENCOUNTER — APPOINTMENT (OUTPATIENT)
Dept: PHYSICAL THERAPY | Age: 75
End: 2020-09-25
Payer: MEDICARE

## 2020-09-28 ENCOUNTER — HOSPITAL ENCOUNTER (OUTPATIENT)
Dept: PHYSICAL THERAPY | Age: 75
Discharge: HOME | End: 2020-09-28
Payer: MEDICARE

## 2020-09-28 PROCEDURE — 97140 MANUAL THERAPY 1/> REGIONS: CPT

## 2020-09-28 PROCEDURE — 97110 THERAPEUTIC EXERCISES: CPT

## 2020-09-28 NOTE — PROGRESS NOTES
PHYSICAL THERAPY - DAILY TREATMENT NOTE     Patient Name: Hugh Platt        Date: 2020  : 1945   YES Patient  Verified  Visit #:     Insurance: Payor: Ronald Pires / Plan: [place] MEDICARE PART A & B / Product Type: Medicare /      In time: 5694 Out time: 11   Total Treatment Time: 45     Medicare/BCBS Time Tracking (below)   Total Timed Codes (min):  45 1:1 Treatment Time:  30     TREATMENT AREA =  Right knee pain [M25.561]    SUBJECTIVE    Pain Level (on 0 to 10 scale):  0  / 10 (R) knee  3/10 LBP   Medication Changes/New allergies or changes in medical history, any new surgeries or procedures? NO    If yes, update Summary List   Subjective Functional Status/Changes:  []  No changes reported     Functional improvements: The knee is doing very well lately. I feel like I'm not limping as much. Functional impairments: pain in the (L) lower back, I think it came from doing the [employer]. OBJECTIVE  Modalities Rationale:     decrease inflammation and decrease pain to improve patient's ability to perform pain free ADLs. min [] Estim, type/location:                                      []  att     []  unatt     []  w/US     []  w/ice    []  w/heat    min []  Mechanical Traction: type/lbs                   []  pro   []  sup   []  int   []  cont    []  before manual    []  after manual    min []  Ultrasound, settings/location:      min []  Iontophoresis w/ dexamethasone, location:                                               []  take home patch       []  in clinic    min []  Ice     []  Heat    location/position:    5 (NB) min [x]  Vasopneumatic Device, press/temp: Supine, (R) knee.      min []  Other:    [x] Skin assessment post-treatment (if applicable):    [x]  intact    []  redness- no adverse reaction     []redness  adverse reaction:      30 (20) min Therapeutic Exercise:  [x]  See flow sheet   Rationale:      increase ROM, increase strength, improve coordination and improve balance to improve the patients ability to ambulate. 10 min Manual Therapy: Technique:      [x] S/DTM []IASTM [x]PROM [x] Passive Stretching   [x]manual TPR    []Jt manipulation:Gr I [] II []  III [] IV[] V[]  Treatment Area:     Rationale:      decrease pain, increase ROM, increase tissue extensibility and decrease trigger points to improve patient's ability to perform pain free ADLs. Billed With/As:   [x] TE   [] TA   [] Neuro   [] Self Care Patient Education: [x] Review HEP    [] Progressed/Changed HEP based on:   [] positioning   [] body mechanics   [] transfers   [] heat/ice application    [] other:        Other Objective/Functional Measures: Therex per flow sheet. Post Treatment Pain Level (on 0 to 10) scale:   2  / 10     ASSESSMENT    Assessment/Changes in Function:     TTP (L) lumbar paraspinals, multiple trigger points. Pt reporting some relief following manual in the area. []  See Progress Note/Recertification   Patient will continue to benefit from skilled PT services to modify and progress therapeutic interventions, address functional mobility deficits, address ROM deficits, address strength deficits, analyze and address soft tissue restrictions, analyze and cue movement patterns, analyze and modify body mechanics/ergonomics and assess and modify postural abnormalities to attain remaining goals. Progress toward goals / Updated goals:    No change in progress toward LTG's with today's session.       PLAN    [x]  Upgrade activities as tolerated YES Continue plan of care   []  Discharge due to :    []  Other:      Therapist: Cary Eddy PTA    Date: 9/28/2020 Time: 11:40 AM     Future Appointments   Date Time Provider Christian Mcgill   9/30/2020 11:30 AM Sherald Risk [facility]   10/2/2020  1:15 PM Sherald Risk [facility]   10/5/2020 11:00 AM [facility] PT [place] [facility]   10/7/2020 11:15 AM Leonor Guerra PT [facility]   10/9/2020 11:15 AM Leonor Guerra PT [facility]   10/12/2020 11:00 AM [facility] PT [place] [facility]   10/14[facility]2020 11:30 [facility]   10/16/2020 11:15 AM Zoltan Shah PT [facility]   11/23/2020 11:30 AM Pat Roman MD [facility]   3/24/2021 11:30 AM Damion Roman MD [facility]

## 2020-09-30 ENCOUNTER — HOSPITAL ENCOUNTER (OUTPATIENT)
Dept: PHYSICAL THERAPY | Age: 75
Discharge: HOME | End: 2020-09-30
Payer: MEDICARE

## 2020-09-30 PROCEDURE — 97016 VASOPNEUMATIC DEVICE THERAPY: CPT

## 2020-09-30 PROCEDURE — 97110 THERAPEUTIC EXERCISES: CPT

## 2020-09-30 PROCEDURE — 97140 MANUAL THERAPY 1/> REGIONS: CPT

## 2020-09-30 NOTE — PROGRESS NOTES
PHYSICAL THERAPY - DAILY TREATMENT NOTE    Patient Name: Sylvie Santoro        Date: 2020  : 1945    Patient  Verified: YES  Visit #:     Insurance: Payor: Anayeli Ramey / Plan: [place] MEDICARE PART A & B / Product Type: Medicare /      In time: 11:30 Out time: 12:25   Total Treatment Time: 54     Medicare Time Tracking (below)   Total Timed Codes (min):  45 1:1 Treatment Time:  45     TREATMENT AREA/ DIAGNOSIS = Right knee pain [M25.561]    SUBJECTIVE  Pain Level (on 0 to 10 scale):  0  / 10   Medication Changes/New allergies or changes in medical history, any new surgeries or procedures? NO    If yes, update Summary List   Subjective Functional Status/Changes:  []  No changes reported     Pt reports difficulty walking due to not getting full motion      OBJECTIVE  Modalities Rationale:     decrease inflammation and decrease pain to improve patient's ability to perform ADLs without pain     min [] Estim, type/location:                                      []  att     []  unatt     []  w/US     []  w/ice    []  w/heat    min []  Mechanical Traction: type/lbs                   []  pro   []  sup   []  int   []  cont    []  before manual    []  after manual    min []  Ultrasound, settings/location:      min []  Iontophoresis w/ dexamethasone, location:                                               []  take home patch       []  in clinic    min []  Ice     []  Heat    location/position:    10 min [x]  Vasopneumatic Device, press/temp: R knee    min []  Other:    [] Skin assessment post-treatment (if applicable):    []  intact    []  redness- no adverse reaction     []redness  adverse reaction:        35 min Therapeutic Exercise:  [x]  See flow sheet   Rationale:      increase ROM, increase strength, improve coordination and improve balance to improve the patients ability to perform ADLs without pain       10 min Manual Therapy: STM to R hamstring.  Extension with over pressure   Rationale: increase ROM and increase tissue extensibility to improve patient's ability to perform ADLs without pain      Billed With/As:   [x] TE   [] TA   [] Neuro   [] Self Care Patient Education: [x] Review HEP    [] Progressed/Changed HEP based on:   [] positioning   [] body mechanics   [] transfers   [] heat/ice application    [] other:        Other Objective/Functional Measures:    Lacking full knee extension   Post Treatment Pain Level (on 0 to 10) scale:   0  / 10     ASSESSMENT  Assessment/Changes in Function:     Still showing compensatory movements with RLE. []  See Progress Note/Recertification   Patient will continue to benefit from skilled PT services to modify and progress therapeutic interventions, address functional mobility deficits, address ROM deficits, address strength deficits and analyze and address soft tissue restrictions to attain remaining goals.    Progress toward goals / Updated goals:    Fair Progress to    [] STG    [] LTG  1 as shown by difficulty getting full knee extension needed for normal gait pattern     PLAN  [x]  Upgrade activities as tolerated YES Continue plan of care   []  Discharge due to :    []  Other:      Therapist: Radha Junior DPT     Date: 9/30/2020 Time: 11:43 AM        Future Appointments   Date Time Provider Christian Mcgill   10/2/2020  1:15 PM Alvia Ahmadi [facility]   10/5/2020  9:30 AM Randolph Padilla MD [facility]   10/5/2020 11:00 AM [facility] PT [place] [facility]   10/7/2020 11:15 AM Zay Sahu PT [facility]   10/9/2020 11:15 AM Zay Sahu PT [facility]   10/12/2020 11:00 AM [facility] PT [place] [address] [facility]   10/14/2020 11:30 AM Alvia Ahmadi [facility]   10/16/2020 11:15 AM Zay Sahu PT [facility]   3/24/2021 11:30 AM Nita Wood MD [facility]

## 2020-10-02 ENCOUNTER — HOSPITAL ENCOUNTER (OUTPATIENT)
Dept: PHYSICAL THERAPY | Age: 75
Discharge: HOME | End: 2020-10-02
Payer: MEDICARE

## 2020-10-02 PROCEDURE — 97110 THERAPEUTIC EXERCISES: CPT

## 2020-10-02 PROCEDURE — 97140 MANUAL THERAPY 1/> REGIONS: CPT

## 2020-10-02 NOTE — PROGRESS NOTES
PHYSICAL THERAPY - DAILY TREATMENT NOTE    Patient Name: Sylvie Santoro        Date: 10/2/2020  : 1945    Patient  Verified: YES  Visit #:   32   of   30  Insurance: Payor: Anayeli Ramey / Plan: [place] MEDICARE PART A & B / Product Type: Medicare /      In time: 1:30 Out time: 2:10   Total Treatment Time: 40     Medicare Time Tracking (below)   Total Timed Codes (min):  35 1:1 Treatment Time:  35     TREATMENT AREA/ DIAGNOSIS = Right knee pain [M25.561]    SUBJECTIVE  Pain Level (on 0 to 10 scale):  0  / 10   Medication Changes/New allergies or changes in medical history, any new surgeries or procedures? NO    If yes, update Summary List   Subjective Functional Status/Changes:  []  No changes reported     Difficulty walking due to decreased knee extension. OBJECTIVE  Modalities Rationale:     decrease inflammation and decrease pain to improve patient's ability to perform ADLs without pain     min [] Estim, type/location:                                      []  att     []  unatt     []  w/US     []  w/ice    []  w/heat    min []  Mechanical Traction: type/lbs                   []  pro   []  sup   []  int   []  cont    []  before manual    []  after manual    min []  Ultrasound, settings/location:      min []  Iontophoresis w/ dexamethasone, location:                                               []  take home patch       []  in clinic    min []  Ice     []  Heat    location/position:    5 min [x]  Vasopneumatic Device, press/temp: r knee to decrease swelling and improve extension    min []  Other:    [] Skin assessment post-treatment (if applicable):    []  intact    []  redness- no adverse reaction     []redness  adverse reaction:        25 min Therapeutic Exercise:  [x]  See flow sheet   Rationale:      increase ROM, increase strength and improve coordination to improve the patients ability to perform ADLs without pain       10 min Manual Therapy: STM to R hamstring.  Extension with overpressure Rationale:      decrease pain, increase ROM and increase tissue extensibility to improve patient's ability to perform ADLs without pain    Billed With/As:   [x] TE   [] TA   [] Neuro   [] Self Care Patient Education: [x] Review HEP    [] Progressed/Changed HEP based on:   [] positioning   [] body mechanics   [] transfers   [] heat/ice application    [] other:        Other Objective/Functional Measures:    Still lacking full knee extension   Post Treatment Pain Level (on 0 to 10) scale:   0  / 10     ASSESSMENT  Assessment/Changes in Function:     Pt requires cueing for proper mechanics with exercises. Pt still shows compensatory movements with exercises     []  See Progress Note/Recertification   Patient will continue to benefit from skilled PT services to modify and progress therapeutic interventions, address functional mobility deficits, address ROM deficits, address strength deficits, analyze and address soft tissue restrictions and analyze and cue movement patterns to attain remaining goals. Progress toward goals / Updated goals:    Good Progress to    [] STG    [x] LTG  1 as shown by improved strength needed for ADLs.      PLAN  [x]  Upgrade activities as tolerated YES Continue plan of care   []  Discharge due to :    []  Other:      Therapist: Renny Perera DPT     Date: 10/2/2020 Time: 1:13 PM        Future Appointments   Date Time Provider Christian Mcgill   10/2/2020  1:15 PM Deloria Lansing [facility]   10/5/2020  9:30 AM Lon Patterson MD [facility]   10/5/2020 11:00 AM [facility] [place] [facility]   10/7/2020 11:15 AM Gonzales Ma, PT [facility]   10/9/2020 11:15 AM Gonzales Ma, PT [facility]   10/12/2020 11:00 AM [facility] PT [place] [facility]   10/14/2020 11:30 AM Deloria Lansing [facility]   10/16/2020 11:15 AM Gonzales Ma, PT [facility]   3/24/2021 11:30 AM Delaney Medina MD [facility]

## 2020-10-05 ENCOUNTER — HOSPITAL ENCOUNTER (OUTPATIENT)
Dept: PHYSICAL THERAPY | Age: 75
Discharge: HOME | End: 2020-10-05
Payer: MEDICARE

## 2020-10-05 PROCEDURE — 97110 THERAPEUTIC EXERCISES: CPT

## 2020-10-05 PROCEDURE — 97140 MANUAL THERAPY 1/> REGIONS: CPT

## 2020-10-05 NOTE — PROGRESS NOTES
PHYSICAL THERAPY - DAILY TREATMENT NOTE     Patient Name: Telma Contreras        Date: 10/5/2020  : 1945   YES Patient  Verified  Visit #:      30  Insurance: Payor: Daisha Batres / Plan: [place] MEDICARE PART A & B / Product Type: Medicare /      In time: 11 Out time: 1150   Total Treatment Time: 50     Medicare/BCBS Time Tracking (below)   Total Timed Codes (min):  45 1:1 Treatment Time:  45     TREATMENT AREA =  Right knee pain [M25.561]    SUBJECTIVE    Pain Level (on 0 to 10 scale):  0  / 10   Medication Changes/New allergies or changes in medical history, any new surgeries or procedures? NO    If yes, update Summary List   Subjective Functional Status/Changes:  []  No changes reported     Functional improvements: Pt reporting 0/10 recent max pain. Pt reports 80% improvement in symptoms since beginning PT. Functional impairments: Pt states he can walk for about 75 yds before he needs to take a break. OBJECTIVE  Modalities Rationale:     decrease inflammation and decrease pain to improve patient's ability to perform pain free ADLs. min [] Estim, type/location:                                      []  att     []  unatt     []  w/US     []  w/ice    []  w/heat    min []  Mechanical Traction: type/lbs                   []  pro   []  sup   []  int   []  cont    []  before manual    []  after manual    min []  Ultrasound, settings/location:      min []  Iontophoresis w/ dexamethasone, location:                                               []  take home patch       []  in clinic    min []  Ice     []  Heat    location/position:    5 min [x]  Vasopneumatic Device, press/temp: (R) knee.      min []  Other:    [x] Skin assessment post-treatment (if applicable):    [x]  intact    []  redness- no adverse reaction     []redness  adverse reaction:      30 min Therapeutic Exercise:  [x]  See flow sheet   Rationale:      increase ROM, increase strength, improve coordination and improve balance to improve the patients ability to ambulate. 15 min Manual Therapy: Technique:      [x] S/DTM []IASTM [x]PROM [] Passive Stretching   [x]manual TPR    []Jt manipulation:Gr I [] II []  III [] IV[] V[]  Treatment Area:     Rationale:      decrease pain, increase ROM, increase tissue extensibility and decrease trigger points to improve patient's ability to perform pain free ADLs. Billed With/As:   [x] TE   [] TA   [] Neuro   [] Self Care Patient Education: [x] Review HEP    [] Progressed/Changed HEP based on:   [] positioning   [] body mechanics   [] transfers   [] heat/ice application    [] other:        Other Objective/Functional Measures:    Pt able to navigate up/down 4 steps, (B) UE use, reciprocal gait. (R) knee strength: 5/5 flex and ext     FOTO = 57   GROC = -4     Knee PROM = 0-143 deg      Post Treatment Pain Level (on 0 to 10) scale:   0  / 10     ASSESSMENT    Assessment/Changes in Function:     See PN     []  See Progress Note/Recertification   Patient will continue to benefit from skilled PT services to modify and progress therapeutic interventions, address functional mobility deficits, address ROM deficits, address strength deficits, analyze and address soft tissue restrictions, analyze and cue movement patterns, analyze and modify body mechanics/ergonomics and assess and modify postural abnormalities to attain remaining goals.       Progress toward goals / Updated goals:    See PN     PLAN    [x]  Upgrade activities as tolerated YES Continue plan of care   []  Discharge due to :    []  Other:      Therapist: Cary Eddy PTA    Date: 10/5/2020 Time: 11:05 AM     Future Appointments   Date Time Provider Christian Mcgill   10/7/2020 11:15 AM Leonor Guerra PT [facility]   10/9/2020 11:15 AM Leonor Guerra PT [facility]   10/12/2020 11:00 AM [facility] [place] [facility]   10/14/2020 11:30 AM Sherald Risk [facility]   10/16/2020 11:15 [facility]   3/24/2021 11:30 AM Alisha King MD [facility] OpenPM

## 2020-10-06 NOTE — PROGRESS NOTES
[address] PHYSICAL THERAPY AT [address], [address] [place] [address], [address], [address] [place] [address]  Phone: [phone]  Fax: [phone] THERAPY          Patient Name: Trang Spencer : 1945   Treatment/Medical Diagnosis: Right knee pain [M25.561]   Onset Date: 2020    Referral Source: Fidencio uH DO Start of Care [facility]): 2020   Prior Hospitalization: See Medical History Provider #: [id number]   Prior Level of Function: Unlimited standing/walking tolerance, climbed stairs without limitations   Comorbidities: H/o L knee unicompartment arthroplasty, triple bypass, and bladder surgery for cancer   Medications: Verified on Patient Summary List   Visits from [facility]: 27 Missed Visits: 3     Goal/Measure of Progress Goal Met? 1. Increase FOTo score to > 60, to indicate increased function   Status at last Eval: 49 Current Status: 57 progressing   2. Increase strength to allow recipricol gait descending stairs   Status at last Eval: reciprocal gait ascending stairs but still needing to use step to gait to descend stairs Current Status: Up/down 4 steps, (B) UE, reciprocal gait progressing   3. Full R knee  TKE gait   Status at last Eval: Full active extension after manual therapy, but not walking with full TKE. Active flexion to 125 degrees  Current Status: PROM = 0-143 deg   Limited TKE during gait progressing     Key Functional Changes/Progress: Pt presented to [facility] w/ c/o (R) knee pain. Pt reporting 0/10 recent max pain with 80% improvement in symptoms since beginning PT. Pt reporting gait endurance of about 75 yards prior to needing a break. Current FOTO = 57, GROC = +4. Knee PROM = 0-143 deg. Pt is able to navigate up/down 4 steps with use of (B) UE's, reciprocal gait pattern. Pt is compliant with current HEP.     Problem List: pain affecting function, decrease ROM, decrease strength, impaired gait/ balance, decrease ADL/ functional abilitiies, decrease activity tolerance, decrease flexibility/ joint mobility and decrease transfer abilities   Treatment Plan may include any combination of the following: Therapeutic exercise, Therapeutic activities, Neuromuscular re-education, Physical agent/modality, Gait/balance training, Manual therapy, Patient education, Self Care training, Functional mobility training, Home safety training and Stair training  Patient Goal(s) has been updated and includes:      Goals for this certification period include and are to be achieved in   4  weeks:  1. Increase FOTo score to > 60, to indicate increased function  2. Increase strength to allow recipricol gait descending stairs  3. Full R knee  TKE gait    Frequency / Duration:   Patient to be seen   2   times per week for   4    weeks:  1. Increase FOTO score to > 60, to indicate increased function  2. Increase strength to allow recipricol gait descending stairs  3. Full R knee  TKE gait  Assessments/Recommendations: Pt to continue 2x weekly for up to an additional 4 weeks to further improve LE strength, ROM, and decrease pain in preparation for DC to HEP. If you have any questions/comments please contact us directly at [phone]. Thank you for allowing us to assist in the care of your patient. Therapist Signature: Toan Levy PTA Date: 25/0/8320   Certification Period:  Reporting Period: 10/5/20-1/5/21 9/9/2020 - 10/5/2020 Time: 6:11 PM   NOTE TO PHYSICIAN:  PLEASE COMPLETE THE ORDERS BELOW AND FAX TO   [facility] at [address]: [phone].   If you are unable to process this request in 24 hours please contact our office: [phone].    ___ I have read the above report and request that my patient continue as recommended.   ___ I have read the above report and request that my patient continue therapy with the following changes/special instructions: ________________________________________________ ___ I have read the above report and request that my patient be discharged from therapy.      Physician Signature:        Date:       Time:

## 2020-10-07 ENCOUNTER — HOSPITAL ENCOUNTER (OUTPATIENT)
Dept: PHYSICAL THERAPY | Age: 75
Discharge: HOME | End: 2020-10-07
Payer: MEDICARE

## 2020-10-07 PROCEDURE — 97016 VASOPNEUMATIC DEVICE THERAPY: CPT

## 2020-10-07 PROCEDURE — 97140 MANUAL THERAPY 1/> REGIONS: CPT

## 2020-10-07 PROCEDURE — 97110 THERAPEUTIC EXERCISES: CPT

## 2020-10-07 NOTE — PROGRESS NOTES
PHYSICAL THERAPY - DAILY TREATMENT NOTE     Patient Name: Lupe Pate        Date: 10/7/2020  : 1945   YES Patient  Verified  Visit #:     Insurance: Payor: Isidro Katz / Plan: [place] MED[place] PART [place] & B / Product Type: Medicare /      In time: 1120 Out time: 6643   Total Treatment Time: 50     Medicare/BCBS Time Tracking (below)   Total Timed Codes (min):  45 1:1 Treatment Time: 45     TREATMENT AREA =  Right knee pain [M25.561]    SUBJECTIVE    Pain Level (on 0 to 10 scale):  0  / 10   Medication Changes/New allergies or changes in medical history, any new surgeries or procedures? NO    If yes, update Summary List   Subjective Functional Status/Changes:  []  No changes reported   Pt with reports of slight improvement in ability to WB thru R LE with heel contact/strike at initial contact. He notes consistent HEP improving symptoms but he notes significant improvements on the days of his PT visits in amount of extension and amb quality. OBJECTIVE  Modalities Rationale:     decrease inflammation and decrease pain to improve patient's ability to perform ADLs with less pain.    min [] Estim, type/location:                                      []  att     []  unatt     []  w/US     []  w/ice    []  w/heat    min []  Mechanical Traction: type/lbs                   []  pro   []  sup   []  int   []  cont    []  before manual    []  after manual    min []  Ultrasound, settings/location:      min []  Iontophoresis w/ dexamethasone, location:                                               []  take home patch       []  in clinic     min []  Ice     []  Heat    location/position:      5 min [x]  Vasopneumatic Device, press/temp: R knee in supine, mod to full compression    min []  Other:    [x] Skin assessment post-treatment (if applicable):    [x]  intact    []  redness- no adverse reaction     []redness  adverse reaction:      30 min Therapeutic Exercise:  [x]  See flow sheet   Rationale:      increase ROM, increase strength and improve coordination to improve the patients ability to perform ADLs, IADLs with less pain      15 min Manual Therapy: Technique:      [x] S/DTM [x]IASTM [x]PROM [x] Passive Stretching   [x]manual TPR    [x]Jt  manipulation:Gr I [x] II [x]  III [x] IV[x] V[]  Treatment Area:      IASTM to Lateral quad, distal Hamstrings release, Patellar and Tibiofemoral for knee extension progression       Rationale:      decrease pain, increase ROM, increase tissue extensibility and decrease trigger points to improve patient's ability to perform ADLs with less pain. Billed With/As:   [x] TE   [] TA   [] Neuro   [] Self Care Patient Education: [x] Review HEP    [] Progressed/Changed HEP based on:   [] positioning   [] body mechanics   [] transfers   [] heat/ice application    [] other:        Other Objective/Functional Measures:  Advanced to IASTM to improve restrictions in EROM extension. Pt lacking 5 degrees ext at onset of therapy. Post Treatment Pain Level (on 0 to 10) scale:   0  / 10     ASSESSMENT    Assessment/Changes in Function:     Pt with progression of hamstrings stretching length and intensity. AROM to 2-3 degrees after manual and there ex techniques this day. Full PROM knee ext and continued ed on use during functional activity to improve carryover. Pt unable to perform > 5 min on [employer] secondary to knee ext position, will modify at next session. []  See Progress Note/Recertification   Patient will continue to benefit from skilled PT services to modify and progress therapeutic interventions, address functional mobility deficits, address ROM deficits, address strength deficits, analyze and address soft tissue restrictions, analyze and cue movement patterns, analyze and modify body mechanics/ergonomics and assess and modify postural abnormalities to attain remaining goals.       Progress toward goals / Updated goals:    FOTO score to 57 from 49, good progress toward LTG of FOTO to 60 degrees.      PLAN    [x]  Upgrade activities as tolerated YES Continue plan of care   []  Discharge due to :    []  Other:      Therapist: Vandana Meadows PT    Date: 10/7/2020 Time: 11 AM     Future Appointments   Date Time Provider Christian Mcgill   10/9/2020 11:15 AM Ranjith Cross PT [facility]   10/12/2020 11:00 AM [facility] [place] [facility]   10/14/2020 11:30 AM Angelic Mcneil [facility]   10/16/2020 11:15 AM Ranjith Cross PT [facility]   3/24/2021 11:30 AM Veronica Solomon MD [facility]

## 2020-10-09 ENCOUNTER — HOSPITAL ENCOUNTER (OUTPATIENT)
Dept: PHYSICAL THERAPY | Age: 75
Discharge: HOME | End: 2020-10-09
Payer: MEDICARE

## 2020-10-09 PROCEDURE — 97140 MANUAL THERAPY 1/> REGIONS: CPT

## 2020-10-09 PROCEDURE — 97110 THERAPEUTIC EXERCISES: CPT

## 2020-10-09 NOTE — PROGRESS NOTES
PHYSICAL THERAPY - DAILY TREATMENT NOTE     Patient Name: Fabby Silva        Date: 10/9/2020  : 1945   YES Patient  Verified  Visit #:   34   of   34  Insurance: Payor: Jaqueline Vega / Plan: [place] MEDICARE PART A & B / Product Type: Medicare /      In time: 299 Out time:    Total Treatment Time: 55     Medicare/BCBS Time Tracking (below)   Total Timed Codes (min):  50 1:1 Treatment Time:  45     TREATMENT AREA =  Right knee pain [M25.561]    SUBJECTIVE    Pain Level (on 0 to 10 scale):  3  / 10   Medication Changes/New allergies or changes in medical history, any new surgeries or procedures? NO    If yes, update Summary List   Subjective Functional Status/Changes:  []  No changes reported       Functional improvements: Pt reports MD pleased with progress but ordered more PT related to inability to perform full WB activity without pain, limited mobility. Functional impairments: Pain with amb, transfers.          OBJECTIVE  Modalities Rationale:     decrease inflammation and decrease pain to improve patient's ability to perform ADLs with less pain      min [] Estim, type/location:                                      []  att     []  unatt     []  w/US     []  w/ice    []  w/heat    min []  Mechanical Traction: type/lbs                   []  pro   []  sup   []  int   []  cont    []  before manual    []  after manual    min []  Ultrasound, settings/location:      min []  Iontophoresis w/ dexamethasone, location:                                               []  take home patch       []  in clinic   5 min [x]  Ice     []  Heat    location/position: Supine with knee ext    min []  Vasopneumatic Device, press/temp:     min []  Other:    [x] Skin assessment post-treatment (if applicable):    [x]  intact    []  redness- no adverse reaction     []redness  adverse reaction:      30/25 min Therapeutic Exercise:  [x]  See flow sheet   Rationale:      increase ROM and increase strength to improve the patients ability to perform ADLs with less pain       20 min Manual Therapy: Technique:      [x] S/DTM [x]IASTM []PROM [] Passive Stretching   [x]manual TPR    [x]Jt manipulation:Gr I [x] II [x]  III [x] IV[x] V[]  Treatment Area:  IASTM to Hamstrings, STM to quad, lateral hamstrings and gastroc, PROM and joint mobility to improve knee ext    Rationale:      decrease pain, increase ROM, increase tissue extensibility and decrease trigger points to improve patient's ability to perform ADLs with less pain      Billed With/As:   [x] TE   [] TA   [] Neuro   [] Self Care Patient Education: [x] Review HEP    [] Progressed/Changed HEP based on:   [] positioning   [] body mechanics   [] transfers   [] heat/ice application    [] other:        Other Objective/Functional Measures:    Increased resistance with SLR. Added retro amb and emphasis on ext with there ex progressions. Added HR and gastroc stretch. Post Treatment Pain Level (on 0 to 10) scale:  0 / 10     ASSESSMENT    Assessment/Changes in Function:     Pt ed on performing increased hamstrings, gastroc stretching for HEP to improve knee ext ROM. Pt ed on safe progression of activity without use of SPC to improve amb with less pain. []  See Progress Note/Recertification   Patient will continue to benefit from skilled PT services to modify and progress therapeutic interventions, address functional mobility deficits, address ROM deficits, address strength deficits and analyze and address soft tissue restrictions to attain remaining goals. Progress toward goals / Updated goals:    Good progress toward newly established goals.      PLAN    [x]  Upgrade activities as tolerated YES Continue plan of care   []  Discharge due to :    []  Other:      Therapist: Tammie Doty, PT    Date: 10/9/2020 Time: 11AM     Future Appointments   Date Time Provider Christian Mcgill   10/12/2020 11:00 AM [facility] [place] [facility]   10/14/2020 11:30 AM [facility] 10/16/2020 11:15 AM Sharmaine Carcamo PT [facility] [address]   3/24/2021 11:30 AM Chloe Hernandez MD [facility]

## 2020-10-12 ENCOUNTER — HOSPITAL ENCOUNTER (OUTPATIENT)
Dept: PHYSICAL THERAPY | Age: 75
Discharge: HOME | End: 2020-10-12
Payer: MEDICARE

## 2020-10-12 PROCEDURE — 97140 MANUAL THERAPY 1/> REGIONS: CPT

## 2020-10-12 PROCEDURE — 97110 THERAPEUTIC EXERCISES: CPT

## 2020-10-12 NOTE — PROGRESS NOTES
PHYSICAL THERAPY - DAILY TREATMENT NOTE     Patient Name: Ye Cisneros        Date: 10/12/2020  : 1945   YES Patient  Verified  Visit #:      of   42  Insurance: Payor: Michel Arredondo / Plan: [place] MEDICARE PART A & B / Product Type: Medicare /      In time: 11 Out time: 1150   Total Treatment Time: 50     Medicare/BCBS Time Tracking (below)   Total Timed Codes (min):  45 1:1 Treatment Time:  45     TREATMENT AREA =  Right knee pain [M25.561]    SUBJECTIVE    Pain Level (on 0 to 10 scale):  0  / 10   Medication Changes/New allergies or changes in medical history, any new surgeries or procedures? NO    If yes, update Summary List   Subjective Functional Status/Changes:  []  No changes reported       Functional improvements: I'm walking a little better but it's still stiff. Functional impairments: Still unable to get knee fully straight while walking. OBJECTIVE  Modalities Rationale:     decrease inflammation and decrease pain to improve patient's ability to perform pain free ADLs. min [] Estim, type/location:                                      []  att     []  unatt     []  w/US     []  w/ice    []  w/heat    min []  Mechanical Traction: type/lbs                   []  pro   []  sup   []  int   []  cont    []  before manual    []  after manual    min []  Ultrasound, settings/location:      min []  Iontophoresis w/ dexamethasone, location:                                               []  take home patch       []  in clinic   5 min [x]  Ice     []  Heat    location/position: Supine, (R) knee. min []  Vasopneumatic Device, press/temp:     min []  Other:    [x] Skin assessment post-treatment (if applicable):    [x]  intact    []  redness- no adverse reaction     []redness  adverse reaction:      25 min Therapeutic Exercise:  [x]  See flow sheet   Rationale:      increase ROM, increase strength, improve coordination and improve balance to improve the patients ability to ambulate. 20 min Manual Therapy: Technique:      [x] S/DTM []IASTM [x]PROM [] Passive Stretching   [x]manual TPR    []Jt manipulation:Gr I [] II []  III [] IV[] V[]  Treatment Area:     Rationale:      decrease pain, increase ROM, increase tissue extensibility and decrease trigger points to improve patient's ability to perform pain free ADLs. Billed With/As:   [x] TE   [] TA   [] Neuro   [] Self Care Patient Education: [x] Review HEP    [] Progressed/Changed HEP based on:   [] positioning   [] body mechanics   [] transfers   [] heat/ice application    [] other:        Other Objective/Functional Measures: Therex per flow sheet. Post Treatment Pain Level (on 0 to 10) scale:   5  / 10     ASSESSMENT    Assessment/Changes in Function:     No exacerbation of symptoms with today's session. []  See Progress Note/Recertification   Patient will continue to benefit from skilled PT services to modify and progress therapeutic interventions, address functional mobility deficits, address ROM deficits, address strength deficits, analyze and address soft tissue restrictions, analyze and cue movement patterns, analyze and modify body mechanics/ergonomics, assess and modify postural abnormalities and address imbalance/dizziness to attain remaining goals.       Progress toward goals / Updated goals:    Fair progress toward LTG #3     PLAN    [x]  Upgrade activities as tolerated YES Continue plan of care   []  Discharge due to :    []  Other:      Therapist: Tucker Macdonald PTA    Date: 10/12/2020 Time: 2:11 PM     Future Appointments   Date Time Provider Christian Mcgill   10/14/2020 11:30 AM Roberta Milroy [facility]   10/16/2020 11:15 AM Bulgarian Spring, PT [facility]   10/19/2020 11:45 AM [facility] PT [address] [facility]   10/21/2020 12:00 PM Bulgarian [place], PT [facility]   10/26/2020 12:15 PM Roberta Milroy [facility]   10/28/2020  3:00 PM Raphael Schmidt [facility]   10/30/2020 11:00 AM Tashi Vail PT [facility]   11/2/2020 11:00 AM Sharmaine Carcamo [facility]   3/24/2021 11:30 AM Chloe Hernandez MD [facility]

## 2020-10-19 ENCOUNTER — HOSPITAL ENCOUNTER (OUTPATIENT)
Dept: PHYSICAL THERAPY | Age: 75
Discharge: HOME | End: 2020-10-19
Payer: MEDICARE

## 2020-10-19 PROCEDURE — 97110 THERAPEUTIC EXERCISES: CPT

## 2020-10-19 PROCEDURE — 97140 MANUAL THERAPY 1/> REGIONS: CPT

## 2020-10-19 NOTE — PROGRESS NOTES
PHYSICAL THERAPY - DAILY TREATMENT NOTE     Patient Name: Zakia Haynes        Date: 10/19/2020  : 1945   YES Patient  Verified  Visit #:   32   of   42  Insurance: Payor: Jo Ann Alarcon / Plan: [place] MEDICARE PART A & B / Product Type: Medicare /      In time: 1150 Out time: 1456   Total Treatment Time: 45     Medicare/BCBS Time Tracking (below)   Total Timed Codes (min):  40 1:1 Treatment Time:  40     TREATMENT AREA =  Right knee pain [M25.561]    SUBJECTIVE    Pain Level (on 0 to 10 scale):  0   / 10   Medication Changes/New allergies or changes in medical history, any new surgeries or procedures? NO    If yes, update Summary List   Subjective Functional Status/Changes:  []  No changes reported     Functional improvements: I'm feeling like I have more endurance. Functional impairments: I still can't get the knee straight. OBJECTIVE  Modalities Rationale:     decrease inflammation and decrease pain to improve patient's ability to perform pain free ADLs. min [] Estim, type/location:                                      []  att     []  unatt     []  w/US     []  w/ice    []  w/heat    min []  Mechanical Traction: type/lbs                   []  pro   []  sup   []  int   []  cont    []  before manual    []  after manual    min []  Ultrasound, settings/location:      min []  Iontophoresis w/ dexamethasone, location:                                               []  take home patch       []  in clinic   5 min [x]  Ice     []  Heat    location/position: Supine, (L) knee. min []  Vasopneumatic Device, press/temp:     min []  Other:    [x] Skin assessment post-treatment (if applicable):    [x]  intact    []  redness- no adverse reaction     []redness  adverse reaction:      30 min Therapeutic Exercise:  [x]  See flow sheet   Rationale:      increase ROM, increase strength, improve coordination and improve balance to improve the patients ability to perform pain free ADLs.       10 min Manual Therapy: Technique:      [x] S/DTM []IASTM [x]PROM [] Passive Stretching   [x]manual TPR    []Jt manipulation:Gr I [] II []  III [] IV[] V[]  Treatment Area:     Rationale:      decrease pain, increase ROM, increase tissue extensibility and decrease trigger points to improve patient's ability to perform pain free ADLs. Billed With/As:   [x] TE   [] TA   [] Neuro   [] Self Care Patient Education: [x] Review HEP    [] Progressed/Changed HEP based on:   [] positioning   [] body mechanics   [] transfers   [] heat/ice application    [] other:        Other Objective/Functional Measures: Therex per flow sheet. Post Treatment Pain Level (on 0 to 10) scale:   0   / 10     ASSESSMENT    Assessment/Changes in Function:     No exacerbation of symptoms with today's session. Limited knee ROM. []  See Progress Note/Recertification   Patient will continue to benefit from skilled PT services to modify and progress therapeutic interventions, address functional mobility deficits, address ROM deficits, address strength deficits, analyze and address soft tissue restrictions, analyze and cue movement patterns, analyze and modify body mechanics/ergonomics and assess and modify postural abnormalities to attain remaining goals. Progress toward goals / Updated goals:    No change in progress toward LTG's with today's session.       PLAN    [x]  Upgrade activities as tolerated YES Continue plan of care   []  Discharge due to :    []  Other:      Therapist: Mariano Steven PTA    Date: 10/19/2020 Time: 2:09 PM     Future Appointments   Date Time Provider Christian Mcgill   10/21/2020 12:00 PM [facility] [address]   10/23/2020  1:30 PM [address]   10/26/2020 12:15 PM [facility] [address]   10/28/2020  3:00 PM Kathy Jorge [address]   10/30/2020 11:00 AM Almaz Castillo [facility] [address]   11/2/2020 11:00 AM Almaz Castillo [place] [id number]   3/24/2021 11:30 AM Cliff Verdugo MD [facility]

## 2020-10-21 ENCOUNTER — HOSPITAL ENCOUNTER (OUTPATIENT)
Dept: PHYSICAL THERAPY | Age: 75
Discharge: HOME | End: 2020-10-21
Payer: MEDICARE

## 2020-10-21 PROCEDURE — 97140 MANUAL THERAPY 1/> REGIONS: CPT

## 2020-10-21 PROCEDURE — 97112 NEUROMUSCULAR REEDUCATION: CPT

## 2020-10-21 PROCEDURE — 97110 THERAPEUTIC EXERCISES: CPT

## 2020-10-21 NOTE — PROGRESS NOTES
PHYSICAL THERAPY - DAILY TREATMENT NOTE     Patient Name: Bob Cho        Date: 10/21/2020  : 1945   YES Patient  Verified  Visit #:      35  Insurance: Payor: Ayesha Fleming / Plan: [place] MED[place] PART A & B / Product Type: Medicare /      In time: 12 Out time: 94   Total Treatment Time: 45     Medicare/BCBS Time Tracking (below)   Total Timed Codes (min):  45 1:1 Treatment Time:  45     TREATMENT AREA =  Right knee pain [M25.561]    SUBJECTIVE    Pain Level (on 0 to 10 scale):  0 / 10   Medication Changes/New allergies or changes in medical history, any new surgeries or procedures? NO    If yes, update Summary List   Subjective Functional Status/Changes:  []  No changes reported       Pt reports knee buckling on 10/20/20, reports he does not feel as strong and \"aware\" of the right leg with amb, ADLs. OBJECTIVE      20 min Therapeutic Exercise:  [x]  See flow sheet   Rationale:      increase ROM and increase strength to improve the patients ability to perform amb     10 min Neuromuscular Re-ed: [x]  See flow sheet   Rationale:      improve coordination, improve balance and increase proprioception to improve the patients ability to perform ADLs, amb with improved safety     15 min Manual Therapy: Technique:      [x] S/DTM [x]IASTM [x]PROM [] Passive Stretching   []manual TPR    [x]Jt manipulation:Gr I [x] II [x]  III [x] IV[x] V[]  Treatment Area:  STM and joint mobility to improve ext; IASTM to hamstrings, gastroc   Rationale:      decrease pain, increase ROM and increase tissue extensibility to improve patient's ability to perform ADLs and amb with full knee ext.       Billed With/As:   [x] TE   [] TA   [x] Neuro   [] Self Care Patient Education: [x] Review HEP    [] Progressed/Changed HEP based on:   [] positioning   [] body mechanics   [] transfers   [] heat/ice application    [] other:        Other Objective/Functional Measures:    Balance assessment secondary to LOB noted upon entering clinic. FGA and Aquino Balance score obtained. Post Treatment Pain Level (on 0 to 10) scale:   0  / 10     ASSESSMENT    Assessment/Changes in Function:     Pt with heel lift trial on L to promote knee ext R. Pt with ed and emphasis on use of dynamic extension to improve knee extension and function. []  See Progress Note/Recertification   Patient will continue to benefit from skilled PT services to modify and progress therapeutic interventions, address functional mobility deficits, address ROM deficits, address strength deficits, analyze and address soft tissue restrictions, analyze and cue movement patterns, analyze and modify body mechanics/ergonomics and assess and modify postural abnormalities to attain remaining goals. Progress toward goals / Updated goals:    Updated goal:  4. Pt will demonstrate Aquino Balance Score and FGA at >45/56 and 25/30 respectively to improve safety.        PLAN    [x]  Upgrade activities as tolerated YES Continue plan of care   []  Discharge due to :    []  Other:      Therapist: Maya Camacho PT    Date: 10/21/2020 Time: 6:47 AM     Future Appointments   Date Time Provider Christian Mcgill   10/21/2020 12:00 PM Lucero Bergeron PT [facility]   10/23/2020  1:30 PM [facility] [place] [facility]   10/26/2020 12:15 PM Valjean Scala [facility]   10/28/2020  3:00 PM Valjean Scala [facility]   10/30/2020 11:00 AM Lucero Bergeron PT [facility]   11/2/2020 11:00 AM [facility] PT [place] [facility]   3/24/2021 11:30 AM Muriel Forbes MD [facility]

## 2020-10-26 ENCOUNTER — HOSPITAL ENCOUNTER (OUTPATIENT)
Dept: PHYSICAL THERAPY | Age: 75
Discharge: HOME | End: 2020-10-26
Payer: MEDICARE

## 2020-10-26 PROCEDURE — 97140 MANUAL THERAPY 1/> REGIONS: CPT

## 2020-10-26 PROCEDURE — 97110 THERAPEUTIC EXERCISES: CPT

## 2020-10-26 NOTE — PROGRESS NOTES
PHYSICAL THERAPY - DAILY TREATMENT NOTE    Patient Name: Sylvie Santoro        Date: 10/26/2020  : 1945    Patient  Verified: YES  Visit #:   35   of   36  Insurance: Payor: Anayeli Ramey / Plan: [place] MEDICARE PART A & B / Product Type: Medicare /      In time: 12:15 Out time: 1:10   Total Treatment Time: 55     Medicare Time Tracking (below)   Total Timed Codes (min):  50 1:1 Treatment Time:  50     TREATMENT AREA/ DIAGNOSIS = Right knee pain [M25.561]    SUBJECTIVE  Pain Level (on 0 to 10 scale):  0  / 10   Medication Changes/New allergies or changes in medical history, any new surgeries or procedures? NO    If yes, update Summary List   Subjective Functional Status/Changes:  []  No changes reported     Pt reports difficulty walking due to decreased mobility      OBJECTIVE  Modalities Rationale:     decrease inflammation and decrease pain to improve patient's ability to perform ADLs without pain     min [] Estim, type/location:                                      []  att     []  unatt     []  w/US     []  w/ice    []  w/heat    min []  Mechanical Traction: type/lbs                   []  pro   []  sup   []  int   []  cont    []  before manual    []  after manual    min []  Ultrasound, settings/location:      min []  Iontophoresis w/ dexamethasone, location:                                               []  take home patch       []  in clinic   5 min [x]  Ice     []  Heat    location/position: R knee    min []  Vasopneumatic Device, press/temp:     min []  Other:    [] Skin assessment post-treatment (if applicable):    []  intact    []  redness- no adverse reaction     []redness  adverse reaction:        40 min Therapeutic Exercise:  [x]  See flow sheet   Rationale:      increase ROM and increase strength to improve the patients ability to perform ADLs without pain       10 min Manual Therapy: STM to hamstring and gastroc.     Rationale:      increase ROM and increase tissue extensibility to improve patient's ability to perform ADLs without pain    Billed With/As:   [x] TE   [] TA   [] Neuro   [] Self Care Patient Education: [x] Review HEP    [] Progressed/Changed HEP based on:   [] positioning   [] body mechanics   [] transfers   [] heat/ice application    [] other:        Other Objective/Functional Measures:    Lacking full knee extension   Post Treatment Pain Level (on 0 to 10) scale:   0  / 10     ASSESSMENT  Assessment/Changes in Function:     Pt showing decreased balance. Pt required CGA with dynamic balance exercises      []  See Progress Note/Recertification   Patient will continue to benefit from skilled PT services to modify and progress therapeutic interventions, address functional mobility deficits, address ROM deficits, address strength deficits, analyze and address soft tissue restrictions, analyze and cue movement patterns and analyze and modify body mechanics/ergonomics to attain remaining goals.    Progress toward goals / Updated goals:    Slow Progress to    [] STG    [x] LTG  1 as shown by still having difficulty with walking     PLAN  [x]  Upgrade activities as tolerated YES Continue plan of care   []  Discharge due to :    []  Other:      Therapist: Joy Mcgee DPT     Date: 10/26/2020 Time: 12:30 PM        Future Appointments   Date Time Provider Christian Mcgill   10/28/2020  3:00 PM Aniyah Carltona [facility]   10/30/2020 11:00 AM Lucero Bergeron PT [facility]   11/2/2020 11:00 AM [facility] PT [place] [facility]   11/4/2020 12:00 PM Med Marc PT [facility]   3/24/2021 11:30 AM Muriel Forbes MD [facility]

## 2020-10-28 ENCOUNTER — HOSPITAL ENCOUNTER (OUTPATIENT)
Dept: PHYSICAL THERAPY | Age: 75
Discharge: HOME | End: 2020-10-28
Payer: MEDICARE

## 2020-10-28 PROCEDURE — 97110 THERAPEUTIC EXERCISES: CPT

## 2020-10-28 PROCEDURE — 97140 MANUAL THERAPY 1/> REGIONS: CPT

## 2020-10-28 NOTE — PROGRESS NOTES
[facility] PHYSICAL THERAPY AT [address] [facility], [address] [place] [address], [address], [address] [place] [address]  Phone: [phone]  Fax: [phone] THERAPY          Patient Name: Mehnaz Richey : 1945   Treatment/Medical Diagnosis: Right knee pain [M25.561]   Onset Date: 2020    Referral Source: Shane Oneal DO Start of Care [facility]): 2020   Prior Hospitalization: See Medical History Provider #: [id number]   Prior Level of Function: Unlimited standing/walking tolerance, climbed stairs without limitations   Comorbidities: H/o L knee unicompartment arthroplasty, triple bypass, and bladder surgery for cancer   Medications: Verified on Patient Summary List   Vis[facility] from [facility]: 34 Missed Visits: 3     Goal/Measure of Progress Goal Met? 1. Increase FOTo score to > 60, to indicate increased function   Status at last Eval: 57 Current Status: DNA n/a   2. Increase strength to allow recipricol gait descending stairs   Status at last Eval: Up/down 4 steps, (B) UE, reciprocal gait Current Status: same no   3. Full R knee  TKE gait   Status at last Eval: PROM = 0-143 deg   Limited TKE during gait Current Status: See below no         Key Functional Changes/Progress:   Pt has shown little improvement overall. Pt recently has shown a regression. Pt still lacking full extension. AROM ext: -12 degress and PROM ext: -6 degress which is a decrease from previously. Pt reports he probably isnt performing the HEP as regularly as he should be. Recently pt has been showing signs of decreased balance. Only began about one month ago. Pt ambulating on forefoot, having difficulty with initiating gait, diffiuclty with 3 step commands. Performed mcfarland balance test and patient scored 32/56 indicating he is a significant fall risk. Also performed functional gait assessment and score 15/30 also indicating significant fall risk.  Pt reports no change in status, falls, or anything abnormal to cause this change in balance. Problem List: decrease ROM, decrease strength, impaired gait/ balance, decrease ADL/ functional abilitiies and decrease transfer abilities   Treatment Plan may include any combination of the following: Therapeutic exercise, Therapeutic activities, Neuromuscular re-education, Gait/balance training, Manual therapy, Patient education, Self Care training, Functional mobility training, Home safety training and Stair training  Patient Goal(s) has been updated and includes:      Goals for this certification period include and are to be achieved in   4  weeks:  Continue goals above  Frequency / Duration:   Patient to be seen   2   times per week for   4    weeks:    Assessments/Recommendations: Pt to return to physician for further assessment  If you have any questions/comments please contact us directly at [phone]. Thank you for allowing us to assist in the care of your patient. Therapist Signature: Ramesh Bellamy Date: 45/71/5536   Certification Period:  Reporting Period: 10/28/2020-1/28/2021  10/5/2020-10/28/2020 Time: 4:06 PM   NOTE TO PHYSICIAN:  PLEASE COMPLETE THE ORDERS BELOW AND FAX TO   [facility] at [place]: [phone]. If you are unable to process this request in 24 hours please contact our office: [phone].    ___ I have read the above report and request that my patient continue as recommended.   ___ I have read the above report and request that my patient continue therapy with the following changes/special instructions: ________________________________________________   ___ I have read the above report and request that my patient be discharged from therapy.      Physician Signature:        Date:       Time:

## 2020-10-28 NOTE — PROGRESS NOTES
PHYSICAL THERAPY - DAILY TREATMENT NOTE    Patient Name: Cristy Nunn        Date: 10/28/2020  : 1945    Patient  Verified: YES  Visit #:      35  Insurance: Payor: Molly Farris / Plan: [place] MEDICARE PART A & B / Product Type: Medicare /      In time: 3:00 Out time: 3:50   Total Treatment Time: 50     Medicare Time Tracking (below)   Total Timed Codes (min):  40 1:1 Treatment Time:  40     TREATMENT AREA/ DIAGNOSIS = Right knee pain [M25.561]    SUBJECTIVE  Pain Level (on 0 to 10 scale):  0  / 10   Medication Changes/New allergies or changes in medical history, any new surgeries or procedures?     NO    If yes, update Summary List   Subjective Functional Status/Changes:  []  No changes reported     See Recert      OBJECTIVE  Modalities Rationale:     decrease inflammation to improve patient's ability to perform ADLs without pain     min [] Estim, type/location:                                      []  att     []  unatt     []  w/US     []  w/ice    []  w/heat    min []  Mechanical Traction: type/lbs                   []  pro   []  sup   []  int   []  cont    []  before manual    []  after manual    min []  Ultrasound, settings/location:      min []  Iontophoresis w/ dexamethasone, location:                                               []  take home patch       []  in clinic   10 min [x]  Ice     []  Heat    location/position: R knee    min []  Vasopneumatic Device, press/temp:     min []  Other:    [] Skin assessment post-treatment (if applicable):    []  intact    []  redness- no adverse reaction     []redness  adverse reaction:        30 min Therapeutic Exercise:  [x]  See flow sheet   Rationale:      increase ROM and increase strength to improve the patients ability to perform ADLs without pain       10 min Manual Therapy: PROM to r knee   Rationale:      increase ROM and increase tissue extensibility to improve patient's ability to perform ADLs without pain    Billed With/As:   [x] TE   [] TA [] Neuro   [] Self Care Patient Education: [x] Review HEP    [] Progressed/Changed HEP based on:   [] positioning   [] body mechanics   [] transfers   [] heat/ice application    [] other:        Other Objective/Functional Measures:    See Recert   Post Treatment Pain Level (on 0 to 10) scale:   0  / 10     ASSESSMENT  Assessment/Changes in Function:     See Recert     []  See Progress Note/Recertification   Patient will continue to benefit from skilled PT services to modify and progress therapeutic interventions, address functional mobility deficits, address ROM deficits, address strength deficits and analyze and address soft tissue restrictions to attain remaining goals.    Progress toward goals / Updated goals:    See Recert     PLAN  [x]  Upgrade activities as tolerated YES Continue plan of care   []  Discharge due to :    []  Other:      Therapist: Murray Smith DPT     Date: 10/28/2020 Time: 3:16 PM        Future Appointments   Date Time Provider Christian Mcgill   10/30/2020 11:00 AM Rebekah Ruiz PT [facility]   11/2/2020 11:00 AM [facility] [place] [facility]   11/4/2020 12:00 PM Norman Estrada PT [facility]   3/24/2021 11:30 AM Madelyn Avila MD [facility]

## 2020-10-30 ENCOUNTER — HOSPITAL ENCOUNTER (OUTPATIENT)
Dept: PHYSICAL THERAPY | Age: 75
Discharge: HOME | End: 2020-10-30
Payer: MEDICARE

## 2020-10-30 PROCEDURE — 97112 NEUROMUSCULAR REEDUCATION: CPT

## 2020-10-30 PROCEDURE — 97140 MANUAL THERAPY 1/> REGIONS: CPT

## 2020-10-30 PROCEDURE — 97110 THERAPEUTIC EXERCISES: CPT

## 2020-10-30 NOTE — PROGRESS NOTES
PHYSICAL THERAPY - DAILY TREATMENT NOTE     Patient Name: Nadya Bundy        Date: 10/30/2020  : 1945   YES Patient  Verified  Visit #:     Insurance: Payor: Rayne Hunt / Plan: [place] MEDICARE PART A & B / Product Type: Medicare /      In time: 11 Out time: 3490   Total Treatment Time: 45     Medicare/BCBS Time Tracking (below)   Total Timed Codes (min):  45 1:1 Treatment Time:  45     TREATMENT AREA =  Right knee pain [M25.561]    SUBJECTIVE    Pain Level (on 0 to 10 scale):  0 / 10   Medication Changes/New allergies or changes in medical history, any new surgeries or procedures? yes    If yes, update Summary List   Subjective Functional Status/Changes:  []  No changes reported     Saw MD yesterday and he performed a steroid injection for R Knee. MD recommended he continue PT and address HEP in visits between time in clinic. Requests updated HEP. OBJECTIVE      20 min Therapeutic Exercise:  [x]  See flow sheet   Rationale:      increase ROM and increase strength to improve the patients ability to perform amb     10 min Neuromuscular Re-ed: [x]  See flow sheet   Rationale:      improve coordination, improve balance and increase proprioception to improve the patients ability to perform ADLs, amb with improved safety     15 min Manual Therapy: Technique:      [x] S/DTM [x]IASTM [x]PROM [] Passive Stretching   []manual TPR    [x]Jt manipulation:Gr I [] II []  III [] IV[] V[]  Treatment Area: IASTM to Gastroc, soleus, hamstrings > joint mobility secondary to recent injection   Rationale:      decrease pain, increase ROM and increase tissue extensibility to improve patient's ability to perform ADLs and amb with full knee ext for safety.        Billed With/As:   [x] TE   [] TA   [x] Neuro   [] Self Care Patient Education: [x] Review HEP    [] Progressed/Changed HEP based on:   [] positioning   [] body mechanics   [] transfers   [] heat/ice application    [] other:        Other Objective/Functional Measures:    Updated HEP this day: There ex with eccentric emphasis. Pt with ed on prolonged hamstrings stretching seated and prone. Post Treatment Pain Level (on 0 to 10) scale:   0  / 10     ASSESSMENT    Assessment/Changes in Function:     Pt with LOB x 3 related to R foot clearance issues during there ex and NMR this day. Pt with program modification related to steroidal injection, limited OP this day for ROM. Pt with good understanding of current HEP. []  See Progress Note/Recertification   Patient will continue to benefit from skilled PT services to modify and progress therapeutic interventions, address functional mobility deficits, address ROM deficits, address strength deficits, analyze and address soft tissue restrictions, analyze and cue movement patterns, analyze and modify body mechanics/ergonomics and assess and modify postural abnormalities to attain remaining goals. Progress toward goals / Updated goals:    Updated goal:  4. Pt will demonstrate Aquino Balance Score and FGA at >45/56 and 25/30 respectively to improve safety--fair progress toward goals, with LOB noted this day t/o tx, pt with improving safety awareness after ed.        PLAN    [x]  Upgrade activities as tolerated YES Continue plan of care   []  Discharge due to :    []  Other:      Therapist: Dinora Delaney PT    Date: 10/30/2020 Time: 12pm     Future Appointments   Date Time Provider Christian Mcgill   10[facility]30[facility]2020 11[facility] [place] [facility]   11/2/2020 11:00 AM [facility] PT [place] [facility]   11/4/2020 12:00 PM Wanda Bolus, PT [facility]   11/9/2020 11:45 AM Wanda Bolus, PT [facility]   11/11/2020  8:45 AM Wanda Bolus, PT [facility]   11/13/2020 10:30 AM [facility] PT [place] [facility]   11/16/2020 12:30 PM Johnella Height, PT [facility]   11/18/2020  2:30 PM Wanda Bolus, PT [facility]   11/20/2020 11:45 AM Johnella Height, PT [facility]   11/23/2020 11:45 AM Wanda Bolus, PT [facility]   11/25/2020 12:00 PM Aide Harrison PT [facility]   11/30/2020 11:15 AM [facility] PT [place] [facility]   3/24/2021 11:30 AM Latha Zaman MD [facility]

## 2020-11-02 ENCOUNTER — HOSPITAL ENCOUNTER (OUTPATIENT)
Dept: PHYSICAL THERAPY | Age: 75
Discharge: HOME | End: 2020-11-02
Payer: MEDICARE

## 2020-11-02 PROCEDURE — 97140 MANUAL THERAPY 1/> REGIONS: CPT

## 2020-11-02 PROCEDURE — 97110 THERAPEUTIC EXERCISES: CPT

## 2020-11-02 NOTE — PROGRESS NOTES
PHYSICAL THERAPY - DAILY TREATMENT NOTE     Patient Name: Mehnaz Richey        Date: 2020  : 1945   YES Patient  Verified  Visit #:   39   of   40  Insurance: Payor: Moise Matthews / Plan: VA MEDICARE PART A & B / Product Type: Medicare /      In time: 11 Out time: 1140   Total Treatment Time: 40     Medicare/BCBS Time Tracking (below)   Total Timed Codes (min):  30 1:1 Treatment Time:  30     TREATMENT AREA =  Right knee pain [M25.561]    SUBJECTIVE    Pain Level (on 0 to 10 scale):  0  / 10   Medication Changes/New allergies or changes in medical history, any new surgeries or procedures? NO    If yes, update Summary List   Subjective Functional Status/Changes:  []  No changes reported       Functional improvements: I had a cortisone injection last Thursday. It feels pretty good and my knees feel more symmetrical.   Functional impairments:          OBJECTIVE    20 min Therapeutic Exercise:  [x]  See flow sheet   Rationale:      increase ROM, increase strength, improve coordination and improve balance to improve the patients ability to perform pain free ADLs. 10 min Manual Therapy: Technique:      [x] S/DTM []IASTM []PROM [] Passive Stretching   [x]manual TPR    []Jt manipulation:Gr I [] II []  III [] IV[] V[]  Treatment Area:     Rationale:      decrease pain, increase ROM, increase tissue extensibility and decrease trigger points to improve patient's ability to perform pain free ADLs.      Billed With/As:   [x] TE   [] TA   [] Neuro   [] Self Care Patient Education: [x] Review HEP    [] Progressed/Changed HEP based on:   [] positioning   [] body mechanics   [] transfers   [] heat/ice application    [] other:        Other Objective/Functional Measures:    (R) knee ROM:   PROM ext = -2 deg   AROM ext = -5 deg     (R) knee strength:   Flex = 4+/5   Ext = 5/5      Post Treatment Pain Level (on 0 to 10) scale:   0  / 10     ASSESSMENT    Assessment/Changes in Function:     Noted improvement in knee ROM since injection. []  See Progress Note/Recertification   Patient will continue to benefit from skilled PT services to modify and progress therapeutic interventions, address functional mobility deficits, address ROM deficits, address strength deficits, analyze and address soft tissue restrictions, analyze and cue movement patterns, analyze and modify body mechanics/ergonomics and assess and modify postural abnormalities to attain remaining goals. Progress toward goals / Updated goals:    Good progress toward LTG #2.       PLAN    [x]  Upgrade activities as tolerated YES Continue plan of care   []  Discharge due to :    []  Other:      Therapist: Tommy Wong PTA    Date: 11/2/2020 Time: 11:00 AM     Future Appointments   Date Time Provider Christian Nano   11/4/2020 12:00 PM Estil Pronto, PT [facility]   11/9/2020 11:45 AM Estil Pronto, PT [facility]   11/11/2020  8:45 AM Estil Pronto, PT [facility]   11/13/2020 10:30 AM [facility] PT [place] [facility]   11/16/2020 12:30 PM Brent Up PT [facility]   11/18/2020  2:30 PM Estil Pronto, PT [facility]   11/20/2020 11:45 AM Brent Up PT [facility]   11/23/2020 11:45 AM Estil Pronto, PT [facility]   11/25/2020 12:00 PM Estil Pronto, PT [facility]   11/30/2020 11:15 AM [facility] PT [place] [facility]   3/24/2021 11:30 AM Bruce Johnson MD [facility]

## 2020-11-04 ENCOUNTER — HOSPITAL ENCOUNTER (OUTPATIENT)
Dept: PHYSICAL THERAPY | Age: 75
Discharge: HOME | End: 2020-11-04
Payer: MEDICARE

## 2020-11-04 PROCEDURE — 97110 THERAPEUTIC EXERCISES: CPT

## 2020-11-04 PROCEDURE — 97140 MANUAL THERAPY 1/> REGIONS: CPT

## 2020-11-04 NOTE — PROGRESS NOTES
PHYSICAL THERAPY - DAILY TREATMENT NOTE     Patient Name: Mya Chicas        Date: 2020  : 1945   YES Patient  Verified  Visit #:   40   of   40  Insurance: Payor: Concepcion Hwang / Plan: [place] PART [place] & B / Product Type: Medicare /      In time: 1200 Out time: 1250   Total Treatment Time: 50     Medicare/BCBS Time Tracking (below)   Total Timed Codes (min):  40 1:1 Treatment Time:  40     TREATMENT AREA =  Right knee pain [M25.561]    SUBJECTIVE    Pain Level (on 0 to 10 scale):  0   / 10   Medication Changes/New allergies or changes in medical history, any new surgeries or procedures? NO    If yes, update Summary List   Subjective Functional Status/Changes:  []  No changes reported   Coming along now--better since cortisone injection.   Still can't get leg all the way straight           OBJECTIVE  Modalities Rationale:     decrease inflammation and decrease pain to improve patient's ability to straighten knee for gait      min [] Estim, type/location:                                      []  att     []  unatt     []  w/US     []  w/ice    []  w/heat    min []  Mechanical Traction: type/lbs                   []  pro   []  sup   []  int   []  cont    []  before manual    []  after manual    min []  Ultrasound, settings/location:      min []  Iontophoresis w/ dexamethasone, location:                                               []  take home patch       []  in clinic   10 min [x]  Ice     []  Heat    location/position:     min []  Vasopneumatic Device, press/temp:     min []  Other:    [x] Skin assessment post-treatment (if applicable):    [x]  intact    [x]  redness- no adverse reaction     []redness  adverse reaction:      15 min Manual Therapy: Technique:      [x] S/DTM []IASTM []PROM [x] Passive Stretching   [x]manual TPR  [] SOR [] man traction  []Jt manipulation:Gr I [] II []  III [] IV[]   [] OP with REIL    []   Treatment Area:  R knee   Rationale:      decrease pain, increase ROM, increase tissue extensibility and decrease trigger points to improve patient's ability to amb without deviation    25 min Therapeutic Exercise:  [x]  See flow sheet   Rationale:      increase ROM, increase strength and improve balance to improve the patients ability to amb/ perform ADLs without difficulty         Billed With/As:   [] TE   [] TA   [] Neuro   [] Self Care Patient Education: [x] Review HEP    [] Progressed/Changed HEP based on:   [] positioning   [] body mechanics   [] transfers   [] heat/ice application    [] other:        Other Objective/Functional Measures:    AROM (after treatment)= -7 deg from full ext   Post Treatment Pain Level (on 0 to 10) scale:   0  / 10     ASSESSMENT  Assessment/Changes in Function:      Requires UE support for step up           Patient will continue to benefit from skilled PT services to modify and progress therapeutic interventions, address functional mobility deficits, address ROM deficits, address strength deficits, analyze and address soft tissue restrictions, analyze and cue movement patterns and address imbalance/dizziness to attain remaining goals.    Progress toward goals / Updated goals:    Slow Progress to    [] STG    [x] LTG  2 as shown by demonstration of step up         []  See Progress Note/Recertification    PLAN    [x]  Upgrade activities as tolerated {YES) Continue plan of care   []  Discharge due to :    []  Other:      Therapist: Cipriano Sheridan, PT    Date: 11/4/2020 Time: 12:00 PM     Future Appointments   Date Time Provider Christian Mcgill   11/6/2020  9:00 AM [address] [facility]   11/9/2020 11:45 AM Raven Dubose PT [facility]   11/11/2020  8:45 AM Raven Dubose PT [facility]   11/13/2020 10:30 AM [facility] PT [place] [facility]   11/16/2020 12:30 PM Nando Barrow PT [facility]   11/18/2020  2:30 PM Raven Dubose PT [facility]   11/20/2020 11:45 AM Nando Barrow, PT [facility]   11/23/2020 11:45 AM Raven Dubose, PT [facility] 11/25/2020 12:00 PM Clara Razo, PT [facility]   11/30/2020 11:15 AM [facility] PT [address] [facility]   3/24/2021 11:30 AM Zac Gamble MD [facility]

## 2020-11-06 ENCOUNTER — APPOINTMENT (OUTPATIENT)
Dept: PHYSICAL THERAPY | Age: 75
End: 2020-11-06
Payer: MEDICARE

## 2020-11-09 ENCOUNTER — HOSPITAL ENCOUNTER (OUTPATIENT)
Dept: PHYSICAL THERAPY | Age: 75
Discharge: HOME | End: 2020-11-09
Payer: MEDICARE

## 2020-11-09 PROCEDURE — 97140 MANUAL THERAPY 1/> REGIONS: CPT

## 2020-11-09 PROCEDURE — 97110 THERAPEUTIC EXERCISES: CPT

## 2020-11-09 NOTE — PROGRESS NOTES
PHYSICAL THERAPY - DAILY TREATMENT NOTE     Patient Name: Hugh Platt        Date: 2020  : 1945   YES Patient  Verified  Visit #:   45   of   45  Insurance: Payor: Ronaldrandall Pires / Plan: [place] MEDICARE PART A & B / Product Type: Medicare /      In time: 115 Out time: 2   Total Treatment Time: 45     Medicare/BCBS Time Tracking (below)   Total Timed Codes (min):  45 1:1 Treatment Time:  45     TREATMENT AREA =  Right knee pain [M25.561]    SUBJECTIVE    Pain Level (on 0 to 10 scale):  0 / 10   Medication Changes/New allergies or changes in medical history, any new surgeries or procedures? yes    If yes, update Summary List   Subjective Functional Status/Changes:  []  No changes reported     Pt reports MD follow up in 4 weeks currently scheduled. Notes continued improvement since injection (steroidal) and notes improved ROM and walking with improving \"steadiness. \"     OBJECTIVE      20    min Therapeutic Exercise:  [x]  See flow sheet   Rationale:      increase ROM and increase strength to improve the patients ability to perform amb with improved LE mechanics for safety        25 min Manual Therapy: Technique:      [x] S/DTM [x]IASTM [x]PROM [] Passive Stretching   []manual TPR    [x]Jt manipulation:Gr I [] II []  III [] IV[] V[]  Treatment Area: IASTM to Gastroc, soleus, hamstrings f/b joint mobility gr 1-4 for extension in supine and prone. Rationale:      decrease pain, increase ROM and increase tissue extensibility to improve patient's ability to perform ADLs and amb with improved gait mechanics for safety    Billed With/As:   [x] TE   [] TA   [] Neuro   [] Self Care Patient Education: [x] Review HEP    [] Progressed/Changed HEP based on:   [] positioning   [] body mechanics   [] transfers   [] heat/ice application    [] other:        Other Objective/Functional Measures:    Pt with progression of manual PT to improve ROM post recent steroidal injection. PROM 0-3 degrees. AROM 12 degrees. Post Treatment Pain Level (on 0 to 10) scale:   0  / 10     ASSESSMENT    Assessment/Changes in Function:   Pt with improvements in overall ROM and function with current level of extension. PT recommended pt follow up with MD related to current level of function. Recommended pt return to clinic 1 visit post follow up with MD for finalized HEP. []  See Progress Note/Recertification   Patient will continue to benefit from skilled PT services to modify and progress therapeutic interventions, address functional mobility deficits, address ROM deficits, address strength deficits, analyze and address soft tissue restrictions, analyze and cue movement patterns, analyze and modify body mechanics/ergonomics and assess and modify postural abnormalities to attain remaining goals. Progress toward goals / Updated goals:    Fair progress toward ROM goal with recent injection improving ROM to 3 degrees from 0. Recommend pt perform follow up with MD within next 7-10 days to discuss next steps if current limitations do not improve beyond current measurements.        PLAN    [x]  Upgrade activities as tolerated YES Continue plan of care   []  Discharge due to :    []  Other:      Therapist: Charlene Valadez PT    Date: 11/9/2020 Time: 2 pm     Future Appointments   Date Time Provider Christian Mcgill   11/9/2020  1:15 PM Jeff Anguiano [facility]   11/16/2020 10:30 AM Scharlene Fleischer, PT [facility]   3/24/2021 11:30 AM Nancy Joel MD [facility]

## 2020-11-11 ENCOUNTER — APPOINTMENT (OUTPATIENT)
Dept: PHYSICAL THERAPY | Age: 75
End: 2020-11-11
Payer: MEDICARE

## 2020-11-13 ENCOUNTER — APPOINTMENT (OUTPATIENT)
Dept: PHYSICAL THERAPY | Age: 75
End: 2020-11-13
Payer: MEDICARE

## 2020-11-16 ENCOUNTER — APPOINTMENT (OUTPATIENT)
Dept: PHYSICAL THERAPY | Age: 75
End: 2020-11-16
Payer: MEDICARE

## 2020-11-18 ENCOUNTER — HOSPITAL ENCOUNTER (OUTPATIENT)
Dept: PHYSICAL THERAPY | Age: 75
Discharge: HOME | End: 2020-11-18
Payer: MEDICARE

## 2020-11-18 ENCOUNTER — APPOINTMENT (OUTPATIENT)
Dept: PHYSICAL THERAPY | Age: 75
End: 2020-11-18
Payer: MEDICARE

## 2020-11-18 PROCEDURE — 97140 MANUAL THERAPY 1/> REGIONS: CPT

## 2020-11-18 PROCEDURE — 97110 THERAPEUTIC EXERCISES: CPT

## 2020-11-18 NOTE — PROGRESS NOTES
PHYSICAL THERAPY - DAILY TREATMENT NOTE     Patient Name: Gerardine Angelucci        Date: 2020  : 1945   YES Patient  Verified  Visit #:   44   of   44  Insurance: Payor: Dori Rosa / Plan: [place] MEDICARE PART A & B / Product Type: Medicare /      In time: 2510 Out time: 1140   Total Treatment Time: 50     Medicare/BCBS Time Tracking (below)   Total Timed Codes (min):  50 1:1 Treatment Time:  45     TREATMENT AREA =  Right knee pain [M25.561]    SUBJECTIVE    Pain Level (on 0 to 10 scale):  0 / 10   Medication Changes/New allergies or changes in medical history, any new surgeries or procedures? yes    If yes, update Summary List   Subjective Functional Status/Changes:  []  No changes reported     Pt reports MD follow up scheduled for 20. He notes progress is still occurring with ROM improvements noted in amount of \"steadiness\" with amb, reduced LOB noted with performing further PT and steroidal injection. OBJECTIVE      25/20   min Therapeutic Exercise:  [x]  See flow sheet   Rationale:      increase ROM and increase strength to improve the patients ability to perform amb with improved LE mechanics for safety        25 min Manual Therapy: Technique:      [x] S/DTM [x]IASTM [x]PROM [] Passive Stretching   []manual TPR    [x]Jt manipulation:Gr I [] II []  III [] IV[] V[]  Treatment Area: IASTM to Gastroc, soleus post knee ext stretch prone f/b joint mobility for knee ext   Rationale:      decrease pain, increase ROM and increase tissue extensibility to improve patient's ability to perform ADLs and amb with improved gait mechanics for safety    Billed With/As:   [x] TE   [] TA   [] Neuro   [] Self Care Patient Education: [x] Review HEP    [] Progressed/Changed HEP based on:   [] positioning   [] body mechanics   [] transfers   [] heat/ice application    [] other:        Other Objective/Functional Measures:    Pt with extensive ed regarding HEP.   Issued written HEP for further progression in ROM and strength gains. Currently I with HEP for balance and gait progression. Post Treatment Pain Level (on 0 to 10) scale:   0  / 10     ASSESSMENT    Assessment/Changes in Function:   Pt with PROM and  AROM improvements in overall ROM and function with current level of extension. He has made progress towards goals for safety, strength and ROM. []  See Progress Note/Recertification   Patient will continue to benefit from skilled PT services to modify and progress therapeutic interventions, address functional mobility deficits, address ROM deficits, address strength deficits, analyze and address soft tissue restrictions, analyze and cue movement patterns, analyze and modify body mechanics/ergonomics and assess and modify postural abnormalities to attain remaining goals. Progress toward goals / Updated goals:  Pt with improvements in ROM, however, recommend pt follow up with MD at this time secondary to slow gains.        PLAN    [x]  Upgrade activities as tolerated YES Continue plan of care   []  Discharge due to :    []  Other:      Therapist: Kwabena Busby, PT    Date: 11/18/2020 Time: 2 pm     Future Appointments   Date Time Provider Christian Mcgill   3/24/2021 11:30 AM Anabel Frazier MD [facility]

## 2020-11-20 ENCOUNTER — APPOINTMENT (OUTPATIENT)
Dept: PHYSICAL THERAPY | Age: 75
End: 2020-11-20
Payer: MEDICARE

## 2020-11-23 ENCOUNTER — APPOINTMENT (OUTPATIENT)
Dept: PHYSICAL THERAPY | Age: 75
End: 2020-11-23
Payer: MEDICARE

## 2020-11-25 ENCOUNTER — APPOINTMENT (OUTPATIENT)
Dept: PHYSICAL THERAPY | Age: 75
End: 2020-11-25
Payer: MEDICARE

## 2020-11-30 ENCOUNTER — HOSPITAL ENCOUNTER (OUTPATIENT)
Dept: PHYSICAL THERAPY | Age: 75
End: 2020-11-30
Payer: MEDICARE

## 2021-01-13 NOTE — PROGRESS NOTES
[facility] PHYSICAL THERAPY AT [address] [facility], [address] [place] [address] Way, [address], [address] [place] [address]  Phone: [phone]  Fax: [phone] SUMMARY FOR PHYSICAL THERAPY          Patient Name: Caorlina Salcedo : 1945   Treatment/Medical Diagnosis: Right knee pain [M25.561]   Onset Date: 2020    Referral Source: Kezia Simpson DO Start of Care [facility]): 2020   Prior Hospitalization: See Medical History Provider #: [id number]   Prior Level of Function: Unlimited standing/walking tolerance, climbed stairs without limitations   Comorbidities: H/o L knee unicompartment arthroplasty, triple bypass, and bladder surgery for cancer   Medications: Verified on Patient Summary List   Vis[facility] from [facility]: 39 Missed Visits: 3     Goal/Measure of Progress Goal Met? 1. Increase FOTO score to > 60, to indicate increased function   Status at last Eval: 57 Current Status: 57 no   2. Increase strength to allow reciprocal gait descending stairs   Status at last Eval: Up/down 4 steps, (B) UE, reciprocal gait Current Status: Able to descend stairs yes   3. Full R knee  TKE gait   Status at last Eval: PROM = 0-143 deg   Limited TKE during gait Current Status: See below no         Key Functional Changes/Progress:  Pt with return to PT after follow up with MD and recent steroidal injection. He has shown improvement in ROM with PROM, however he continues to be limited in carryover into functional activity. He has been highly motivated to progress and attended all therapy sessions. During treatment, he is able to achieve AROM 0- and PROM 0-3-130 degrees after extensive manual tx and there ex. His balance continues to be limited with single leg activity and pivoting to R, with LOB noted in clinic improving but continued with fatigue after exercise. Assessments/Recommendations: Pt to return to physician for further assessment.  Pt to be D/C to HEP at this time secondary to limited progress in ROM progression, balance dysfunction and continued pain. If you have any questions/comments please contact us directly at [phone]. Thank you for allowing us to assist in the care of your patient.     Therapist Signature: Carlie Diallo, PT Date: 24/12/89   Certification Period:  Reporting Period: n/a  7/21/20-11/28/20 Time: 12 PM

## 2021-01-15 ENCOUNTER — HOSPITAL ENCOUNTER (OUTPATIENT)
Dept: PHYSICAL THERAPY | Age: 76
Discharge: HOME | End: 2021-01-15
Payer: MEDICARE

## 2021-01-15 PROCEDURE — 97162 PT EVAL MOD COMPLEX 30 MIN: CPT

## 2021-01-15 PROCEDURE — 97110 THERAPEUTIC EXERCISES: CPT

## 2021-01-15 NOTE — PROGRESS NOTES
PHYSICAL THERAPY - DAILY TREATMENT NOTE     Patient Name: Chris Amador        Date: 1/15/2021  : 1945   YES Patient  Verified  Visit #:   1     Insurance: Payor: Vonzella Boeck / Plan: [facility] MEDICARE COMPLETE / Product Type: Managed Care Medicare /      In time: 447 Out time: 945   Total Treatment Time: 60     Medicare/BCBS Time Tracking (below)   Total Timed Codes (min):  20 1:1 Treatment Time:  50     TREATMENT AREA =  Right knee pain [M25.561]    SUBJECTIVE    Pain Level (on 0 to 10 scale):  0  / 10   Medication Changes/New allergies or changes in medical history, any new surgeries or procedures?     NO    If yes, update Summary List   Subjective Functional Status/Changes:  []  No changes reported     See eval /POC         OBJECTIVE  Modalities Rationale:     decrease pain to improve patient's ability to return to PLOF      min [] Estim, type/location:                                      []  att     []  unatt     []  w/US     []  w/ice    []  w/heat    min []  Mechanical Traction: type/lbs                   []  pro   []  sup   []  int   []  cont    []  before manual    []  after manual    min []  Ultrasound, settings/location:      min []  Iontophoresis w/ dexamethasone, location:                                               []  take home patch       []  in clinic   10 min [x]  Ice     []  Heat    location/position:     min []  Vasopneumatic Device, press/temp:     min []  Other:    [x] Skin assessment post-treatment (if applicable):    [x]  intact    [x]  redness- no adverse reaction     []redness  adverse reaction:      15 min Therapeutic Exercise:  [x]  See flow sheet   Rationale:      increase ROM and increase strength to improve the patients ability to return to PLOF     5 min Manual Therapy: Technique:      [] S/DTM []IASTM []PROM [x] Passive Stretching   []manual TPR    []Jt manipulation:Gr I [] II []  III [] IV[]  [x]patellar mobes  Treatment Area:     Rationale: decrease pain, increase ROM, increase tissue extensibility and decrease trigger points to improve patient's ability to return to PLOF     min Neuromuscular Re-ed: [x]  See flow sheet   Rationale:      improve coordination, improve balance, increase proprioception and dec dizziness to improve the patients ability to return to PLOF        min Self Care:    Rationale:    increase ROM, increase strength and improve coordination to improve the patients ability to return to PLOF    Billed With/As:   [] TE   [] TA   [] Neuro   [] Self Care Patient Education: [x] Review HEP    [] Progressed/Changed HEP based on:   [] positioning   [] body mechanics   [] transfers   [] heat/ice application    [] other:        Other Objective/Functional Measures:    See eval/ POC     Post Treatment Pain Level (on 0 to 10) scale:   0  / 10     ASSESSMENT    X  See POC     PLAN    [x]  Upgrade activities as tolerated {YES) Continue plan of care   []  Discharge due to :    []  Other:      Therapist: Atilio Ann PT    Date: 1/15/2021 Time: 9:38 AM     Future Appointments   Date Time Provider Christian Mcgill   3/24/2021 11:30 AM Elio Brunner, MD [id number]

## 2021-01-15 NOTE — PROGRESS NOTES
[facility] PHYSICAL THERAPY AT [facility] [address] [place], [address] [place] [address] Ln - Phone: [phone]  Fax: [phone] / [phone]  Patient Name: Sherry Lee : 1945   Medical   Diagnosis: Right knee pain [M25.561] Treatment Diagnosis: S/p R ACL reconstruction   Onset Date: 21 surgery     Referral Source: Brain Braver, DO Start of Care [facility]): 1/15/2021   Prior Hospitalization: See medical history Provider #: [id number]   Prior Level of Function: Full ability to perform ADLs/ amb prior to fall in May 2020   Comorbidities: L partial knee replacement, bladder surgery, triple by-pass   Medications: Verified on Patient Summary List   The Plan of Care and following information is based on the information from the initial evaluation.   ================================================================  Assessment / key information: Sherry Lee is a 70 y.o.  yo male with Dx of Right knee pain [M25.561]. He injured his R knee in May 2020 resulting in fall from a ladder. Injury included tibial fracture and ACL tear. He underwent a course of PT following injury. Due to fracture, ACL reconstruction was postponed until this time. He underwent surgery 21    Observation: ambulates with extension brace, no AD. Gait is antalgic. Mild edema  ROM measures: -11 from extension to 128 degrees  MMT measures: able to perform SLR without lag    FOTO score= 47%.  ================================================================  Eval Complexity: History MEDIUM  Complexity : 1-2 comorbidities / personal factors will impact the outcome/ POC ;  Examination  MEDIUM Complexity : 3 Standardized tests and measures addressing body structure, function, activity limitation and / or participation in recreation ; Presentation MEDIUM Complexity : Evolving with changing characteristics ;   Decision Making MEDIUM Complexity : FOTO score of 26-74; Overall Complexity MEDIUM  Problem List: pain affecting function, decrease ROM, decrease strength, edema affecting function, impaired gait/ balance, decrease ADL/ functional abilitiies and decrease activity tolerance   Treatment Plan may include any combination of the following: Therapeutic exercise, Therapeutic activities, Neuromuscular re-education, Physical agent/modality, Gait/balance training, Manual therapy and Patient education  Patient / Family readiness to learn indicated by: asking questions, trying to perform skills and interest  Persons(s) to be included in education: patient (P)  Barriers to Learning/Limitations: None  Measures taken, if barriers to learning:    Patient Goal (s): No pain   Patient self reported health status: good  Rehabilitation Potential: good     Short Term Goals: To be accomplished in  2  weeks:  1 Patient will report >= 25% improvement in symptoms with ADLs  2 Patient will be educated in appropriate ROM, stabilization, strengthening exercises.  Long Term Goals: To be accomplished in  4-6  weeks:  1 Patient to report >= 75% improvement in symptoms with ADLs. 2 Patient will be independent with finalized HEP/ self maintenance. 3 Increase FOTO score >= 63% to indicate improved function with use of R LE.  4 Restore full AROM for improved ADL participation. Frequency / Duration:   Patient to be seen  2-3  times per week for 4-6  weeks:  Patient / Caregiver education and instruction: self care, activity modification, brace/ splint application and exercises    Therapist Signature: Renee Hussein PT Date: 5/12/0378   Certification Period: 1/15/21 to 4/12/21 Time: 9:39 AM   ===================================================================  I certify that the above Physical Therapy Services are being furnished while the patient is under my care. I agree with the treatment plan and certify that this therapy is necessary.     Physician Signature:        Date:       Time:     Please sign and return to [facility] or you may fax the signed copy to [phone]. Thank you.

## 2021-01-18 ENCOUNTER — HOSPITAL ENCOUNTER (OUTPATIENT)
Dept: PHYSICAL THERAPY | Age: 76
Discharge: HOME | End: 2021-01-18
Payer: MEDICARE

## 2021-01-18 PROCEDURE — 97110 THERAPEUTIC EXERCISES: CPT

## 2021-01-18 NOTE — PROGRESS NOTES
PHYSICAL THERAPY - DAILY TREATMENT NOTE    Patient Name: Ronnie Carreon        Date: 2021  : 1945   YES Patient  Verified  Visit #: 2   of     Insurance: Payor: Salo Correa / Plan: [employer] MEDICARE COMPLETE / Product Type: Managed Care Medicare /      In time: 1:11  Out time: 1:59    Total Treatment Time: 50     Medicare Time /BCBS Tracking (below)   Total Timed Codes (min):  38 1:1 Treatment Time: 38     TREATMENT AREA =  Right knee pain [M25.561]    SUBJECTIVE  Pain Level (on 0 to 10 scale): 0 / 10   Medication Changes/New allergies or changes in medical history, any new surgeries or procedures? NO    If yes, update Summary List   Subjective Functional Status/Changes:  []  No changes reported     Pt reports performing HEP daily.  Pt reports wearing brace except when exercising  Pt noticed some drainge after he removed a steri strip     OBJECTIVE  Modalities Rationale:     decrease edema, decrease inflammation, decrease pain and increase tissue extensibility to improve patient's ability to perform self care   min [] Estim, type/location:                                      []  att     []  unatt     []  w/US     []  w/ice    []  w/heat    min []  Mechanical Traction: type/lbs                   []  pro   []  sup   []  int   []  cont    []  before manual    []  after manual    min []  Ultrasound, settings/location:      min []  Iontophoresis w/ dexamethasone, location:                                               []  take home patch       []  in clinic   10 min [x]  Ice     []  Heat    location/position: R knee LE elevated    min []  Vasopneumatic Device, press/temp:     min []  Other:    [x] Skin assessment post-treatment (if applicable):    [x]  intact    []  redness- no adverse reaction     []redness  adverse reaction:        38 min Therapeutic Exercise:  [x]  See flow sheet   Rationale:      increase ROM and increase strength to improve the patients ability to improve tissue mobility in ADLs     n/a min Manual Therapy:    Rationale:      decrease pain, increase ROM and increase tissue extensibility to improve patient's ability to improve tissue mobility in ADLs        With TE min Patient Education:  YES  Reviewed HEP, pt education in self monitoring incision. Recommended to not take off any steristrips. Contact MD if drainage persists. []  Progressed/Changed HEP based on: Other Objective/Functional Measures:    Supine AROM knee extension: ~ -6 degrees ; self AAROM knee flexion to ~ 90 deg per protocol. Add long sitting hamstring stretch 2x 30 seconds   Post Treatment Pain Level (on 0 to 10) scale:   0 / 10     ASSESSMENT  Assessment/Changes in Function:   Review precautions per protocol. Good improvement toward full knee extension. Pt able to perform supine SLR without ext lag   []  See Progress Note/Recertification   Patient will continue to benefit from skilled PT services to modify and progress therapeutic interventions, address functional mobility deficits, address ROM deficits, address strength deficits, analyze and address soft tissue restrictions, analyze and cue movement patterns and instruct in home and community integration to attain remaining goals.    Progress toward goals / Updated goals:    Functional impairments: performing self care, decreasing strength, ROM, pt ambulating in knee extension brace without AD  Functional improvements: improving ROM, good compliance in HEP     PLAN  []  Upgrade activities as tolerated YES Continue plan of care   []  Discharge due to :    []  Other:      Therapist: Min Brooks PTA    Date: 1/18/2021 Time: 1:59 PM     Future Appointments   Date Time Provider Christian Mcgill   1/20/2021  1:45 PM Gatito Veliz PT [address]   1/22/2021  1:45 PM Seferino Barrow [place] [id number] [address]   1/25/2021  2:00 PM Seferino Barrow [place] [address] [address]   1/27/2021 10:15 AM Gatito Veliz PT [address]   1/29/2021 11:15 AM Gatito Veliz PT [facility]   2/1/2021 11:00 AM Blake Potter, PT [facility]   2/3/2021 11:00 AM Blake Potter, PT [facility]   3/24/2021 11:30 AM Dianna Diaz MD [address] [facility]

## 2021-01-20 ENCOUNTER — HOSPITAL ENCOUNTER (OUTPATIENT)
Dept: PHYSICAL THERAPY | Age: 76
Discharge: HOME | End: 2021-01-20
Payer: MEDICARE

## 2021-01-20 PROCEDURE — 97140 MANUAL THERAPY 1/> REGIONS: CPT

## 2021-01-20 PROCEDURE — 97110 THERAPEUTIC EXERCISES: CPT

## 2021-01-20 NOTE — PROGRESS NOTES
PHYSICAL THERAPY - DAILY TREATMENT NOTE     Patient Name: Deidra Hayes        Date: 2021  : 1945    Patient  Verified: YES  Visit #:   3   of   12  Insurance: Payor: Nino Neri / Plan: [employer]DR[employer] MEDICARE COMPLETE / Product Type: Managed Care Medicare /      In time: 10:45 Out time: 11:25   Total Treatment Time: 40     Medicare Time Tracking (below)   Total Timed Codes (min):  30 1:1 Treatment Time:  30     TREATMENT AREA/ DIAGNOSIS = Right knee pain [M25.561]    SUBJECTIVE  Pain Level (on 0 to 10 scale):  0  / 10   Medication Changes/New allergies or changes in medical history, any new surgeries or procedures? NO    If yes, update Summary List   Subjective Functional Status/Changes:  []  No changes reported     Pt reports being able to walk better. Less pain      OBJECTIVE  Modalities Rationale:     decrease inflammation and decrease pain to improve patient's ability to perform ADLs without pain     min [] Estim, type/location:                                      []  att     []  unatt     []  w/US     []  w/ice    []  w/heat    min []  Mechanical Traction: type/lbs                   []  pro   []  sup   []  int   []  cont    []  before manual    []  after manual    min []  Ultrasound, settings/location:      min []  Iontophoresis w/ dexamethasone, location:                                               []  take home patch       []  in clinic   10 min [x]  Ice     []  Heat    location/position: r knee    min []  Vasopneumatic Device, press/temp:     min []  Other:    [] Skin assessment post-treatment (if applicable):    []  intact    []  redness- no adverse reaction     []redness  adverse reaction:        20 min Therapeutic Exercise:  [x]  See flow sheet   Rationale:      increase ROM, increase strength and improve coordination to improve the patients ability to perform ADLs without pain       10 min Manual Therapy: PROM to R knee. Extension with over pressure.  Patella mobs Rationale:      increase ROM and increase tissue extensibility to improve patient's ability to perform ADLs without pain  The manual therapy interventions were performed at a separate and distinct time from the therapeutic activities interventions      Billed With/As:   [x] TE   [] TA   [] Neuro   [] Self Care Patient Education: [x] Review HEP    [] Progressed/Changed HEP based on:   [] positioning   [] body mechanics   [] transfers   [] heat/ice application    [] other:        Other Objective/Functional Measures:    PROM flexion good within protocol. Stops at 80 but would be able to do more  Still lacking 5-6deg of knee extension   Post Treatment Pain Level (on 0 to 10) scale:   0  / 10     ASSESSMENT  Assessment/Changes in Function:     Pt showed little to no extension lag with straight leg raises     []  See Progress Note/Recertification   Patient will continue to benefit from skilled PT services to modify and progress therapeutic interventions, address functional mobility deficits, address ROM deficits, address strength deficits, analyze and address soft tissue restrictions and analyze and cue movement patterns to attain remaining goals.    Progress toward goals / Updated goals:    Good Progress to    [] STG    [x] LTG  1 as shown by improved overall walking and pain levels since onset of care     PLAN  [x]  Upgrade activities as tolerated YES Continue plan of care   []  Discharge due to :    []  Other:      Therapist: Sarah Pinedo DPT     Date: 1/20/2021 Time: 10:00 AM        Future Appointments   Date Time Provider Christian Mcgill   1/20/2021 10:45 AM Bentley China [facility]   1/22/2021  1:45 PM Teddy Lin PTA [facility]   1/25/2021  2:00 PM Teddy Lin PTA [facility]   1/27/2021 10:15 AM John Comment, PT [facility]   1/29/2021 11:15 AM John Comment, PT [facility]   2/1/2021 11:00 AM John Comment, PT [facility]   2/3/2021 11:00 AM John Boudreaux, PT [facility]   3/24/2021 11:30 AM Paul Sheriff MD [address]

## 2021-01-22 ENCOUNTER — HOSPITAL ENCOUNTER (OUTPATIENT)
Dept: PHYSICAL THERAPY | Age: 76
Discharge: HOME | End: 2021-01-22
Payer: MEDICARE

## 2021-01-22 PROCEDURE — 97110 THERAPEUTIC EXERCISES: CPT

## 2021-01-22 PROCEDURE — 97140 MANUAL THERAPY 1/> REGIONS: CPT

## 2021-01-22 NOTE — PROGRESS NOTES
PHYSICAL THERAPY - DAILY TREATMENT NOTE     Patient Name: Ernestine Luna        Date: 2021  : 1945    Patient  Verified: YES  Visit #:  of   12  Insurance: Payor: Abner Lang / Plan: [employer] MEDICARE COMPLETE / Product Type: Managed Care Medicare /      In time: 1:53  Out time: 2:36 pm   Total Treatment Time: 37     Medicare Time Tracking (below)   Total Timed Codes (min):  33 1:1 Treatment Time:  33     TREATMENT AREA/ DIAGNOSIS = Right knee pain [M25.561]    SUBJECTIVE  Pain Level (on 0 to 10 scale):  0  / 10   Medication Changes/New allergies or changes in medical history, any new surgeries or procedures?     NO    If yes, update Summary List   Subjective Functional Status/Changes:  []  No changes reported     Pt reports doing HEP 1x per day and doubles up the reps  Slight weeping from incison where steri strip came off      OBJECTIVE  Modalities Rationale:     decrease inflammation and decrease pain to improve patient's ability to perform ADLs without pain     min [] Estim, type/location:                                      []  att     []  unatt     []  w/US     []  w/ice    []  w/heat    min []  Mechanical Traction: type/lbs                   []  pro   []  sup   []  int   []  cont    []  before manual    []  after manual    min []  Ultrasound, settings/location:      min []  Iontophoresis w/ dexamethasone, location:                                               []  take home patch       []  in clinic   10 min [x]  Ice     []  Heat    location/position: R knee LE elevated    min []  Vasopneumatic Device, press/temp:     min []  Other:    [x] Skin assessment post-treatment (if applicable):    [x]  intact    []  redness- no adverse reaction     []redness  adverse reaction:        25 min Therapeutic Exercise:  [x]  See flow sheet   Rationale:      increase ROM, increase strength and improve coordination to improve the patients ability to perform ADLs without pain       8 min Manual Therapy: PROM to R knee. Extension with over pressure. Patella mobs   Rationale:      increase ROM and increase tissue extensibility to improve patient's ability to perform ADLs without pain  The manual therapy interventions were performed at a separate and distinct time from the therapeutic activities interventions      Billed With/As:   [x] TE   [] TA   [] Neuro   [] Self Care Patient Education: [x] Review HEP    [] Progressed/Changed HEP based on:   [] positioning   [] body mechanics   [] transfers   [] heat/ice application    [] other:        Other Objective/Functional Measures:  AROM: left knee extension: -5  PROM: -3 degrees        Post Treatment Pain Level (on 0 to 10) scale:   0  / 10     ASSESSMENT  Assessment/Changes in Function:   Recommended pt to monitor incision to contact MD regarding weeping   Pt demonstrating good quad recruitment in quad set and supine SLR  Review proper fit of extension base as pt came into appointment where brace had fallen down to distal quad , reviewed precautions in supine AAROM knee flexion with strap to stay within protocol ROM. []  See Progress Note/Recertification   Patient will continue to benefit from skilled PT services to modify and progress therapeutic interventions, address functional mobility deficits, address ROM deficits, address strength deficits, analyze and address soft tissue restrictions and analyze and cue movement patterns to attain remaining goals.    Progress toward goals / Updated goals:    Good Progress to    [] STG    [x] LTG  1 as shown by improved overall walking and pain levels since onset of care     PLAN  [x]  Upgrade activities as tolerated YES Continue plan of care   []  Discharge due to :    []  Other:      Therapist: Dennis Brewer PTA     Date: 1/22/2021 Time: 2:36 pm        Future Appointments   Date Time Provider Christian Mcgill   1/22/2021  1:45 PM Cheyenne Range, PTA [facility]   1/25/2021  2:00 PM Regenia Range, PTA [facility] 1/27/2021 10:15 AM Matteo Tony, PT [facility]   1/29/2021 11:15 AM Matteo Tony PT [facility]   2/1/2021 11:00 AM Matteo Tony, PT [facility]   2/3/2021 11:00 AM Matteo Tony PT [facility]   3/24/2021 11:30 AM Cooper Paredes MD [address]

## 2021-01-25 ENCOUNTER — HOSPITAL ENCOUNTER (OUTPATIENT)
Dept: PHYSICAL THERAPY | Age: 76
Discharge: HOME | End: 2021-01-25
Payer: MEDICARE

## 2021-01-25 PROCEDURE — 97140 MANUAL THERAPY 1/> REGIONS: CPT

## 2021-01-25 PROCEDURE — 97110 THERAPEUTIC EXERCISES: CPT

## 2021-01-25 NOTE — PROGRESS NOTES
PHYSICAL THERAPY - DAILY TREATMENT NOTE     Patient Name: Nereida Gary        Date: 2021  : 1945    Patient  Verified: YES  Visit #: of   12  Insurance: Payor: Isaura Mulligan / Plan: [employer] MEDICARE COMPLETE / Product Type: Managed Care Medicare /      In time: 2:10                Out time: 2:47 pm   Total Treatment Time: 37     Medicare Time Tracking (below)   Total Timed Codes (min):  27 1:1 Treatment Time:  27     TREATMENT AREA/ DIAGNOSIS = Right knee pain [M25.561]    SUBJECTIVE  Pain Level (on 0 to 10 scale):  0  / 10   Medication Changes/New allergies or changes in medical history, any new surgeries or procedures? NO    If yes, update Summary List   Subjective Functional Status/Changes:  []  No changes reported     Pt reports he called his MD office. Can go to the walk in place or see how it does over the weekend. Nurse called first thing this morning. No weeping since Saturday.       OBJECTIVE  Modalities Rationale:     decrease inflammation and decrease pain to improve patient's ability to perform ADLs without pain     min [] Estim, type/location:                                      []  att     []  unatt     []  w/US     []  w/ice    []  w/heat    min []  Mechanical Traction: type/lbs                   []  pro   []  sup   []  int   []  cont    []  before manual    []  after manual    min []  Ultrasound, settings/location:      min []  Iontophoresis w/ dexamethasone, location:                                               []  take home patch       []  in clinic   10 min [x]  Ice     []  Heat    location/position: R knee LE elevated    min []  Vasopneumatic Device, press/temp:     min []  Other:    [x] Skin assessment post-treatment (if applicable):    [x]  intact    []  redness- no adverse reaction     []redness  adverse reaction:        19 min Therapeutic Exercise:  [x]  See flow sheet   Rationale:      increase ROM, increase strength and improve coordination to improve the patients ability to perform ADLs without pain       8 min Manual Therapy: PROM to R knee. Extension with over pressure. Patella mobs   Rationale:      increase ROM and increase tissue extensibility to improve patient's ability to perform ADLs without pain  The manual therapy interventions were performed at a separate and distinct time from the therapeutic activities interventions      Billed With/As:   [x] TE   [] TA   [] Neuro   [] Self Care Patient Education: [x] Review HEP    [] Progressed/Changed HEP based on:   [] positioning   [] body mechanics   [] transfers   [] heat/ice application    [] other:        Other Objective/Functional Measures:     AROM right knee extension: ~-5 , PROM: ~ -3 degrees  Self AAROM R knee flexion ~ 90 deg ,   Exercise completed per flow sheet and protocol. Post Treatment Pain Level (on 0 to 10) scale:   0  / 10     ASSESSMENT  Assessment/Changes in Function  Pt demonstrating good quad control in supine SLR and supine AAROM knee flexion with assist from belt. Review self monitoring of incision. Contact MD if weeping begins again   []  See Progress Note/Recertification   Patient will continue to benefit from skilled PT services to modify and progress therapeutic interventions, address functional mobility deficits, address ROM deficits, address strength deficits, analyze and address soft tissue restrictions and analyze and cue movement patterns to attain remaining goals.    Progress toward goals / Updated goals:    Good Progress to    [] STG    [x] LTG  1 as shown by improved overall walking and pain levels since onset of care     PLAN  [x]  Upgrade activities as tolerated YES Continue plan of care   []  Discharge due to :    []  Other:      Therapist: Regino Peres PTA     Date: 1/25/2021 Time: 2:47 pm        Future Appointments   Date Time Provider Christian Mcgill   1/27/2021 10:15 AM Cortez Santana PT [facility]   1/29/2021 11:15 AM Cortez Santana PT [facility] 2/1/2021 11:00 AM Bhavana Cano, PT [facility]   2/3/2021 11:00 AM Bhavana Cano, PT [facility]   3/24/2021 11:30 AM Molly Uribe MD [address] [facility]

## 2021-01-27 ENCOUNTER — HOSPITAL ENCOUNTER (OUTPATIENT)
Dept: PHYSICAL THERAPY | Age: 76
Discharge: HOME | End: 2021-01-27
Payer: MEDICARE

## 2021-01-27 PROCEDURE — 97140 MANUAL THERAPY 1/> REGIONS: CPT

## 2021-01-27 PROCEDURE — 97110 THERAPEUTIC EXERCISES: CPT

## 2021-01-27 NOTE — PROGRESS NOTES
PHYSICAL THERAPY - DAILY TREATMENT NOTE     Patient Name: Any Stevens        Date: 2021  : 1945   YES Patient  Verified  Visit #:      12  Insurance: Payor: Lillian Lazo / Plan: [facility] DEV[employer]DRA MEDICARE COMPLETE / Product Type: Managed Care Medicare /      In time: 1010 Out time: 1100   Total Treatment Time: 50     Medicare/BCBS Time Tracking (below)   Total Timed Codes (min):  40 1:1 Treatment Time:  40     TREATMENT AREA =  Right knee pain [M25.561]    SUBJECTIVE    Pain Level (on 0 to 10 scale):  0  / 10   Medication Changes/New allergies or changes in medical history, any new surgeries or procedures?     NO    If yes, update Summary List   Subjective Functional Status/Changes:  []  No changes reported   Surgical incision fully closed           OBJECTIVE  Modalities Rationale:     decrease inflammation and decrease pain to improve patient's ability to amb without pain/ return to PLOF      min [] Estim, type/location:                                      []  att     []  unatt     []  w/US     []  w/ice    []  w/heat    min []  Mechanical Traction: type/lbs                   []  pro   []  sup   []  int   []  cont    []  before manual    []  after manual    min []  Ultrasound, settings/location:      min []  Iontophoresis w/ dexamethasone, location:                                               []  take home patch       []  in clinic   10 min [x]  Ice     []  Heat    location/position:     min []  Vasopneumatic Device, press/temp:     min []  Other:    [x] Skin assessment post-treatment (if applicable):    [x]  intact    []  redness- no adverse reaction     []redness  adverse reaction:      10 min Manual Therapy: Technique:      [x] S/DTM []IASTM []PROM [x] Passive Stretching - ext  [x]manual TPR  [] SOR [] man traction  []Jt manipulation:Gr I [] II []  III [] IV[]   [] OP with REIL    [x] patellar mobes  Treatment Area:  R knee/ quad/ HS      *manual therapy interventions were performed at a separate and distinct time from   the therapeutic activities interventions. Rationale:      decrease pain, increase ROM, increase tissue extensibility and decrease trigger points to improve patient's ability to amb without deviation/ return to PLOF    30 min Therapeutic Exercise:  [x]  See flow sheet   Rationale:      increase ROM and increase strength to improve the patients ability to amb/ perform amb/ ADLs         Billed With/As:   [] TE   [] TA   [] Neuro   [] Self Care Patient Education: [x] Review HEP    [] Progressed/Changed HEP based on:   [] positioning   [] body mechanics   [] transfers   [] heat/ice application    [] other:        Other Objective/Functional Measures:    Inc reps as noted    AROM ext= -5 from ext;  AAROM flex (no stretch)= 118   Post Treatment Pain Level (on 0 to 10) scale:   0  / 10     ASSESSMENT  Assessment/Changes in Function:      Functional improvement:  improved tolerance for walking, improved mechanics, less pain             Patient will continue to benefit from skilled PT services to modify and progress therapeutic interventions, address functional mobility deficits, address ROM deficits, address strength deficits, analyze and address soft tissue restrictions, analyze and cue movement patterns and instruct in home and community integration to attain remaining goals. Progress toward goals / Updated goals:    Progressing strength and endurance.   Slowly improved extension ROM         []  See Progress Note/Recertification    PLAN    [x]  Upgrade activities as tolerated {YES) Continue plan of care   []  Discharge due to :    []  Other:      Therapist: Yvette Shin PT    Date: 1/27/2021 Time: 10:10 AM     Future Appointments   Date Time Provider Christian Mcgill   1/27/2021 10:15 AM Jackson Dust, PT [facility]   1/29/2021 11:15 AM Jackson Dust, PT [facility]   2/1/2021 11:00 AM Fabian Dust, PT [facility]   2/3/2021 11:00 AM Jackson Dust, PT [facility] 3/24/2021 11:30 AM Gene Gamble MD [address]

## 2021-01-29 ENCOUNTER — HOSPITAL ENCOUNTER (OUTPATIENT)
Dept: PHYSICAL THERAPY | Age: 76
Discharge: HOME | End: 2021-01-29
Payer: MEDICARE

## 2021-01-29 PROCEDURE — 97110 THERAPEUTIC EXERCISES: CPT

## 2021-01-29 PROCEDURE — 97140 MANUAL THERAPY 1/> REGIONS: CPT

## 2021-01-29 NOTE — PROGRESS NOTES
PHYSICAL THERAPY - DAILY TREATMENT NOTE     Patient Name: Edison Elmore        Date: 2021  : 1945   YES Patient  Verified  Visit #:     Insurance: Payor: Ruben Laura / Plan: [facility] DEVENDRA MEDICARE COMPLETE / Product Type: Managed Care Medicare /      In time: 0205 Out time: 1200   Total Treatment Time: 45     Medicare/BCBS Time Tracking (below)   Total Timed Codes (min):  35 1:1 Treatment Time:  35     TREATMENT AREA =  Right knee pain [M25.561]    SUBJECTIVE    Pain Level (on 0 to 10 scale):  0  / 10   Medication Changes/New allergies or changes in medical history, any new surgeries or procedures?     NO    If yes, update Summary List   Subjective Functional Status/Changes:  []  No changes reported   No pain           OBJECTIVE  Modalities Rationale:     decrease inflammation and decrease pain to improve patient's ability to amb/ perform ADLs ithout deviation      min [] Estim, type/location:                                      []  att     []  unatt     []  w/US     []  w/ice    []  w/heat    min []  Mechanical Traction: type/lbs                   []  pro   []  sup   []  int   []  cont    []  before manual    []  after manual    min []  Ultrasound, settings/location:      min []  Iontophoresis w/ dexamethasone, location:                                               []  take home patch       []  in clinic   10 min [x]  Ice     []  Heat    location/position:     min []  Vasopneumatic Device, press/temp:     min []  Other:    [x] Skin assessment post-treatment (if applicable):    [x]  intact    []  redness- no adverse reaction     []redness  adverse reaction:      10 min Manual Therapy: Technique:      [x] S/DTM []IASTM []PROM [x] Passive Stretching- ext   [x]manual TPR  [] SOR [] man traction  []Jt manipulation:Gr I [] II []  III [] IV[]   [] OP with REIL    [x] patellar mobes  Treatment Area: R knee/ distal quad and HS       *manual therapy interventions were performed at a separate and distinct time from   the therapeutic activities interventions. Rationale:      decrease pain, increase ROM, increase tissue extensibility and decrease trigger points to improve patient's ability to return to [facility] without pain    25 min Therapeutic Exercise:  [x]  See flow sheet   Rationale:      increase ROM, increase strength and improve balance to improve the patients ability to amb/ perform ADLs with less pain         Billed With/As:   [] TE   [] TA   [] Neuro   [] Self Care Patient Education: [x] Review HEP    [] Progressed/Changed HEP based on:   [] positioning   [] body mechanics   [] transfers   [] heat/ice application    [] other:        Other Objective/Functional Measures:    Able to passively achieve full ext  3 degrees from fully straight after stretch and manual    Added 1# to PREs   Post Treatment Pain Level (on 0 to 10) scale:   0  / 10     ASSESSMENT  Assessment/Changes in Function:      Improving strength and mobility for improved gait mechanics           Patient will continue to benefit from skilled PT services to modify and progress therapeutic interventions, address functional mobility deficits, address ROM deficits, address strength deficits, analyze and address soft tissue restrictions and analyze and cue movement patterns to attain remaining goals.    Progress toward goals / Updated goals:    Good Progress to    [] STG    [] LTG  4 as shown by rom measures         []  See Progress Note/Recertification    PLAN    [x]  Upgrade activities as tolerated {YES) Continue plan of care   []  Discharge due to :    []  Other:      Therapist: Otis Nelson PT    Date: 1/29/2021 Time: 11:15 AM     Future Appointments   Date Time Provider Christian Mcgill   2/1/2021 11:00 AM Rico Mcwilliams, PT [facility]   2/3/2021 11:00 AM Rico Mcwilliams, PT [facility]   3/24/2021 11:30 AM Unique Murillo MD [id number] [facility]

## 2021-02-01 ENCOUNTER — HOSPITAL ENCOUNTER (OUTPATIENT)
Dept: PHYSICAL THERAPY | Age: 76
Discharge: HOME | End: 2021-02-01
Payer: MEDICARE

## 2021-02-01 PROCEDURE — 97140 MANUAL THERAPY 1/> REGIONS: CPT

## 2021-02-01 PROCEDURE — 97110 THERAPEUTIC EXERCISES: CPT

## 2021-02-01 NOTE — PROGRESS NOTES
PHYSICAL THERAPY - DAILY TREATMENT NOTE     Patient Name: Nereida Gary        Date: 2021  : 1945   YES Patient  Verified  Visit #:     Insurance: Payor: Isaura Mulligan / Plan: [facility] DEVENDRA MEDICARE COMPLETE / Product Type: Managed Care Medicare /      In time: 4285 Out time: 9148   Total Treatment Time: 40     Medicare/BCBS Time Tracking (below)   Total Timed Codes (min):  30 1:1 Treatment Time:  30     TREATMENT AREA =  Right knee pain [M25.561]    SUBJECTIVE    Pain Level (on 0 to 10 scale):  0  / 10   Medication Changes/New allergies or changes in medical history, any new surgeries or procedures?     NO    If yes, update Summary List   Subjective Functional Status/Changes:  []  No changes reported   Knee os good           OBJECTIVE  Modalities Rationale:     decrease inflammation and decrease pain to improve patient's ability to return to PLOF      min [] Estim, type/location:                                      []  att     []  unatt     []  w/US     []  w/ice    []  w/heat    min []  Mechanical Traction: type/lbs                   []  pro   []  sup   []  int   []  cont    []  before manual    []  after manual    min []  Ultrasound, settings/location:      min []  Iontophoresis w/ dexamethasone, location:                                               []  take home patch       []  in clinic   10 min [x]  Ice     []  Heat    location/position:     min []  Vasopneumatic Device, press/temp:     min []  Other:    [] Skin assessment post-treatment (if applicable):    [x]  intact    []  redness- no adverse reaction     []redness  adverse reaction:      10 min Manual Therapy: Technique:      [x] S/DTM []IASTM []PROM [x] Passive Stretching - ext  [x]manual TPR  [] SOR [] man traction  []Jt manipulation:Gr I [] II []  III [] IV[]   [] OP with REIL    []   Treatment Area: R knee       *manual therapy interventions were performed at a separate and distinct time from   the therapeutic activities interventions. Rationale:      decrease pain, increase ROM, increase tissue extensibility and decrease trigger points to improve patient's ability to return to PLOF    20 min Therapeutic Exercise:  [x]  See flow sheet   Rationale:      increase ROM and increase strength to improve the patients ability to return to PLOF         Billed With/As:   [] TE   [] TA   [] Neuro   [] Self Care Patient Education: [x] Review HEP    [] Progressed/Changed HEP based on:   [] positioning   [] body mechanics   [] transfers   [] heat/ice application    [] other:        Other Objective/Functional Measures:    Able to achieve full ext after thre ex and manual   Post Treatment Pain Level (on 0 to 10) scale:   0  / 10     ASSESSMENT  Assessment/Changes in Function:      Functional improvement:  improving ROM for gait mechanics             Patient will continue to benefit from skilled PT services to modify and progress therapeutic interventions, address functional mobility deficits, address ROM deficits, address strength deficits, analyze and address soft tissue restrictions and analyze and cue movement patterns to attain remaining goals.    Progress toward goals / Updated goals:    Good Progress to    [] STG    [x] LTG  4 as shown by ROM observation         []  See Progress Note/Recertification    PLAN    [x]  Upgrade activities as tolerated {YES) Continue plan of care   []  Discharge due to :    []  Other:      Therapist: Yvette Shin PT    Date: 2/1/2021 Time: 11:05 AM     Future Appointments   Date Time Provider Christian Mcgill   2/3/2021 11:00 AM Fabian Ortega PT [facility]   3/24/2021 11:30 AM Paul Sheriff MD [id number]

## 2021-02-03 ENCOUNTER — HOSPITAL ENCOUNTER (OUTPATIENT)
Dept: PHYSICAL THERAPY | Age: 76
Discharge: HOME | End: 2021-02-03
Payer: MEDICARE

## 2021-02-03 PROCEDURE — 97140 MANUAL THERAPY 1/> REGIONS: CPT

## 2021-02-03 PROCEDURE — 97110 THERAPEUTIC EXERCISES: CPT

## 2021-02-03 NOTE — PROGRESS NOTES
PHYSICAL THERAPY - DAILY TREATMENT NOTE     Patient Name: Chris Amador        Date: 2/3/2021  : 1945   YES Patient  Verified  Visit #:     Insurance: Payor: Vonzella Boeck / Plan: [facility] MEDIC[employer] COMPLETE / Product Type: Managed Care Medicare /      In time: 1100 Out time:    Total Treatment Time: 45     Medicare/BCBS Time Tracking (below)   Total Timed Codes (min):  35 1:1 Treatment Time:  35     TREATMENT AREA =  Right knee pain [M25.561]    SUBJECTIVE    Pain Level (on 0 to 10 scale):  0  / 10   Medication Changes/New allergies or changes in medical history, any new surgeries or procedures?     NO    If yes, update Summary List   Subjective Functional Status/Changes:  []  No changes reported   See PN           OBJECTIVE  Modalities Rationale:     decrease inflammation and decrease pain to improve patient's ability to return to PLOF      min [] Estim, type/location:                                      []  att     []  unatt     []  w/US     []  w/ice    []  w/heat    min []  Mechanical Traction: type/lbs                   []  pro   []  sup   []  int   []  cont    []  before manual    []  after manual    min []  Ultrasound, settings/location:      min []  Iontophoresis w/ dexamethasone, location:                                               []  take home patch       []  in clinic   10 min [x]  Ice     []  Heat    location/position:     min []  Vasopneumatic Device, press/temp:     min []  Other:    [x] Skin assessment post-treatment (if applicable):    [x]  intact    []  redness- no adverse reaction     []redness  adverse reaction:      10 min Manual Therapy: Technique:      [x] S/DTM []IASTM []PROM [x] Passive Stretching - ext  [x]manual TPR  [] SOR [] man traction  []Jt manipulation:Gr I [] II []  III [] IV[]   [] OP with REIL    []   Treatment Area:  R knee      *manual therapy interventions were performed at a separate and distinct time from   the therapeutic activities interventions.    Rationale:      decrease pain, increase ROM, increase tissue extensibility and decrease trigger points to improve patient's ability to return to PLOF    25 min Therapeutic Exercise:  [x]  See flow sheet   Rationale:      increase ROM and increase strength to improve the patients ability to return to PLOF         Billed With/As:   [] TE   [] TA   [] Neuro   [] Self Care Patient Education: [x] Review HEP    [] Progressed/Changed HEP based on:   [] positioning   [] body mechanics   [] transfers   [] heat/ice application    [] other:        Other Objective/Functional Measures:  See PN/ DC   Post Treatment Pain Level (on 0 to 10) scale:   0  / 10     ASSESSMENT    [x]  See Progress Note/Recertification/ DC    PLAN    [x]  Upgrade activities as tolerated {YES) Continue plan of care   []  Discharge due to :    []  Other:      Therapist: Rolando Roldan PT    Date: 2/3/2021 Time: 11:21 AM     Future Appointments   Date Time Provider Christian Mcgill   3/24/2021 11:30 AM Radha Lam MD [address] [facility]

## 2021-02-03 NOTE — PROGRESS NOTES
[address] PHYSICAL THERAPY AT [address], [address], [address], [address] [place] [address] - Phone: [phone]  Fax: [phone]   PHYSICAL THERAPY  DISCHARGE        Patient Name: Any Stevens : 1945   Treatment/Medical Diagnosis: Right knee pain [M25.561]   Onset Date: 21 surgery    Referral Source: Gertrudis Taylor DO Start of Care [facility]): 1/15/21   Prior Hospitalization: See Medical History Provider #: [id number]   Prior Level of Function: Full ability to perform ADLs/ amb prior to fall in May 2020   Comorbidities: L partial knee replacement, bladder surgery, 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 2 Patient will be independent with finalized HEP/ self maintenance. 3 Increase FOTO score >= 63% to indicate improved function with use of R LE.  4 Restore full AROM for improved ADL participation. Prior Level/Current Level:  1) Prior Level: na   Current Level: 90% improved while still in brace   Goal Met? Partially met  2) Prior Level: na   Current Level: indep with current HEP, per surgical protocols   Goal Met? yes  3) Prior Level: 47%   Current Level: 57%   Goal Met? progressing  4) Prior Level: AROM:-11 from extension to 128 degrees    Current Level: AROM: -4 from extension to 134 degrees; PROM able to achieve full extension   Goal Met? progressing    Key Functional Changes/Progress: Patient with improved ambulation, requiring no AD other than brace for protection. He demonstrates increasing mobility and strength. He is able to perform SLR without lag.   .  Problem List: pain affecting function, decrease ROM, decrease strength, edema affecting function, impaired gait/ balance, decrease ADL/ functional abilitiies, decrease activity tolerance and decrease flexibility/ joint mobility   Treatment Plan may include any combination of the following: Therapeutic exercise, Therapeutic activities, Neuromuscular re-education, Physical agent/modality, Gait/balance training, Manual therapy and Patient education    Assessments/Recommendations: Patient to be discharged from PT at this clinic as he is moving out of the area. He would benefit from continued PT at his new location. If you have any questions/comments please contact us directly at [phone]. Thank you for allowing us to assist in the care of your patient. Therapist Signature: Suhdir Roca PT Date: 0/8/9896   Certification Period:  Reporting Period: 1/15/21 to 4/12/21  1/15/21 to 2/3/21 Time: 10:51 AM   NOTE TO PHYSICIAN:  PLEASE COMPLETE THE ORDERS BELOW AND FAX TO   [facility] at [address]: [phone]. If you are unable to process this request in 24 hours please contact our office: [phone].    ___ I have read the above report and request that my patient continue as recommended.   ___ I have read the above report and request that my patient continue therapy with the following changes/special instructions: ________________________________________________   ___ I have read the above report and request that my patient be discharged from therapy.      Physician Signature:        Date:       Time: